# Patient Record
Sex: FEMALE | Race: WHITE | Employment: UNEMPLOYED | URBAN - METROPOLITAN AREA
[De-identification: names, ages, dates, MRNs, and addresses within clinical notes are randomized per-mention and may not be internally consistent; named-entity substitution may affect disease eponyms.]

---

## 2017-05-08 PROCEDURE — 88175 CYTOPATH C/V AUTO FLUID REDO: CPT | Performed by: INTERNAL MEDICINE

## 2017-08-11 PROBLEM — H65.23 BILATERAL CHRONIC SEROUS OTITIS MEDIA: Status: ACTIVE | Noted: 2017-08-11

## 2018-05-17 PROCEDURE — 82670 ASSAY OF TOTAL ESTRADIOL: CPT | Performed by: OBSTETRICS & GYNECOLOGY

## 2018-05-17 PROCEDURE — 36415 COLL VENOUS BLD VENIPUNCTURE: CPT | Performed by: OBSTETRICS & GYNECOLOGY

## 2018-05-17 PROCEDURE — 83001 ASSAY OF GONADOTROPIN (FSH): CPT | Performed by: OBSTETRICS & GYNECOLOGY

## 2018-05-17 PROCEDURE — 88305 TISSUE EXAM BY PATHOLOGIST: CPT | Performed by: OBSTETRICS & GYNECOLOGY

## 2018-12-12 PROBLEM — H65.23 BILATERAL CHRONIC SEROUS OTITIS MEDIA: Status: RESOLVED | Noted: 2017-08-11 | Resolved: 2018-12-12

## 2019-09-24 ENCOUNTER — LAB ENCOUNTER (OUTPATIENT)
Dept: LAB | Age: 54
End: 2019-09-24
Attending: PHYSICAL MEDICINE & REHABILITATION
Payer: COMMERCIAL

## 2019-09-24 DIAGNOSIS — K58.9 IRRITABLE BOWEL SYNDROME: ICD-10-CM

## 2019-09-24 DIAGNOSIS — E55.9 VITAMIN D DEFICIENCY, UNSPECIFIED: Primary | ICD-10-CM

## 2019-09-24 DIAGNOSIS — K90.49 EXUDATIVE ENTEROPATHY: ICD-10-CM

## 2019-09-24 LAB
ALBUMIN SERPL-MCNC: 4 G/DL (ref 3.4–5)
ALBUMIN/GLOB SERPL: 1 {RATIO} (ref 1–2)
ALP LIVER SERPL-CCNC: 88 U/L (ref 41–108)
ALT SERPL-CCNC: 26 U/L (ref 13–56)
ANION GAP SERPL CALC-SCNC: 7 MMOL/L (ref 0–18)
AST SERPL-CCNC: 17 U/L (ref 15–37)
BASOPHILS # BLD AUTO: 0.05 X10(3) UL (ref 0–0.2)
BASOPHILS NFR BLD AUTO: 0.7 %
BILIRUB SERPL-MCNC: 0.6 MG/DL (ref 0.1–2)
BUN BLD-MCNC: 9 MG/DL (ref 7–18)
BUN/CREAT SERPL: 9.8 (ref 10–20)
CALCIUM BLD-MCNC: 9.9 MG/DL (ref 8.5–10.1)
CHLORIDE SERPL-SCNC: 104 MMOL/L (ref 98–112)
CO2 SERPL-SCNC: 26 MMOL/L (ref 21–32)
CREAT BLD-MCNC: 0.92 MG/DL (ref 0.55–1.02)
DEPRECATED RDW RBC AUTO: 47 FL (ref 35.1–46.3)
EOSINOPHIL # BLD AUTO: 0.12 X10(3) UL (ref 0–0.7)
EOSINOPHIL NFR BLD AUTO: 1.8 %
ERYTHROCYTE [DISTWIDTH] IN BLOOD BY AUTOMATED COUNT: 13.3 % (ref 11–15)
GLOBULIN PLAS-MCNC: 4 G/DL (ref 2.8–4.4)
GLUCOSE BLD-MCNC: 102 MG/DL (ref 70–99)
HCT VFR BLD AUTO: 46.2 % (ref 35–48)
HGB BLD-MCNC: 15.2 G/DL (ref 12–16)
IMM GRANULOCYTES # BLD AUTO: 0.02 X10(3) UL (ref 0–1)
IMM GRANULOCYTES NFR BLD: 0.3 %
LYMPHOCYTES # BLD AUTO: 1.96 X10(3) UL (ref 1–4)
LYMPHOCYTES NFR BLD AUTO: 28.8 %
M PROTEIN MFR SERPL ELPH: 8 G/DL (ref 6.4–8.2)
MCH RBC QN AUTO: 31.3 PG (ref 26–34)
MCHC RBC AUTO-ENTMCNC: 32.9 G/DL (ref 31–37)
MCV RBC AUTO: 95.3 FL (ref 80–100)
MONOCYTES # BLD AUTO: 0.47 X10(3) UL (ref 0.1–1)
MONOCYTES NFR BLD AUTO: 6.9 %
NEUTROPHILS # BLD AUTO: 4.19 X10 (3) UL (ref 1.5–7.7)
NEUTROPHILS # BLD AUTO: 4.19 X10(3) UL (ref 1.5–7.7)
NEUTROPHILS NFR BLD AUTO: 61.5 %
OSMOLALITY SERPL CALC.SUM OF ELEC: 283 MOSM/KG (ref 275–295)
PLATELET # BLD AUTO: 250 10(3)UL (ref 150–450)
POTASSIUM SERPL-SCNC: 3.8 MMOL/L (ref 3.5–5.1)
RBC # BLD AUTO: 4.85 X10(6)UL (ref 3.8–5.3)
SODIUM SERPL-SCNC: 137 MMOL/L (ref 136–145)
T3 SERPL-MCNC: 63 NG/DL (ref 60–181)
T3FREE SERPL-MCNC: 2.26 PG/ML (ref 2.4–4.2)
T4 FREE SERPL-MCNC: 1.5 NG/DL (ref 0.8–1.7)
T4 SERPL-MCNC: 13.4 UG/DL (ref 4.8–13.9)
THYROPEROXIDASE AB SERPL-ACNC: 973 U/ML (ref ?–60)
TSI SER-ACNC: 0.98 MIU/ML (ref 0.36–3.74)
VIT B12 SERPL-MCNC: 441 PG/ML (ref 193–986)
VIT D+METAB SERPL-MCNC: 33.9 NG/ML (ref 30–100)
WBC # BLD AUTO: 6.8 X10(3) UL (ref 4–11)

## 2019-09-24 PROCEDURE — 84207 ASSAY OF VITAMIN B-6: CPT

## 2019-09-24 PROCEDURE — 85025 COMPLETE CBC W/AUTO DIFF WBC: CPT

## 2019-09-24 PROCEDURE — 84481 FREE ASSAY (FT-3): CPT

## 2019-09-24 PROCEDURE — 84439 ASSAY OF FREE THYROXINE: CPT

## 2019-09-24 PROCEDURE — 82607 VITAMIN B-12: CPT

## 2019-09-24 PROCEDURE — 84443 ASSAY THYROID STIM HORMONE: CPT

## 2019-09-24 PROCEDURE — 84436 ASSAY OF TOTAL THYROXINE: CPT

## 2019-09-24 PROCEDURE — 86376 MICROSOMAL ANTIBODY EACH: CPT

## 2019-09-24 PROCEDURE — 82306 VITAMIN D 25 HYDROXY: CPT

## 2019-09-24 PROCEDURE — 83735 ASSAY OF MAGNESIUM: CPT

## 2019-09-24 PROCEDURE — 80053 COMPREHEN METABOLIC PANEL: CPT

## 2019-09-24 PROCEDURE — 84480 ASSAY TRIIODOTHYRONINE (T3): CPT

## 2019-09-25 PROCEDURE — 88305 TISSUE EXAM BY PATHOLOGIST: CPT | Performed by: OBSTETRICS & GYNECOLOGY

## 2019-09-27 LAB
MAGNESIUM, RBCS: 2.5 MMOL/L
VITAMIN B6: 36.2 NMOL/L

## 2024-02-15 ENCOUNTER — MED REC SCAN ONLY (OUTPATIENT)
Dept: PHYSICAL MEDICINE AND REHAB | Facility: CLINIC | Age: 59
End: 2024-02-15

## 2024-03-21 ENCOUNTER — MED REC SCAN ONLY (OUTPATIENT)
Dept: PHYSICAL MEDICINE AND REHAB | Facility: CLINIC | Age: 59
End: 2024-03-21

## 2024-03-25 ENCOUNTER — OFFICE VISIT (OUTPATIENT)
Dept: PHYSICAL MEDICINE AND REHAB | Facility: CLINIC | Age: 59
End: 2024-03-25
Payer: COMMERCIAL

## 2024-03-25 ENCOUNTER — TELEPHONE (OUTPATIENT)
Dept: PHYSICAL MEDICINE AND REHAB | Facility: CLINIC | Age: 59
End: 2024-03-25

## 2024-03-25 VITALS — RESPIRATION RATE: 18 BRPM | WEIGHT: 210 LBS | HEIGHT: 70 IN | BODY MASS INDEX: 30.06 KG/M2

## 2024-03-25 DIAGNOSIS — M17.10 PRIMARY OSTEOARTHRITIS OF KNEE, UNSPECIFIED LATERALITY: ICD-10-CM

## 2024-03-25 DIAGNOSIS — R26.9 GAIT ABNORMALITY: ICD-10-CM

## 2024-03-25 DIAGNOSIS — M22.2X9 PATELLOFEMORAL PAIN SYNDROME, UNSPECIFIED LATERALITY: ICD-10-CM

## 2024-03-25 DIAGNOSIS — M16.11 PRIMARY OSTEOARTHRITIS OF RIGHT HIP: Primary | ICD-10-CM

## 2024-03-25 DIAGNOSIS — M79.10 MYALGIA: ICD-10-CM

## 2024-03-25 RX ORDER — LEVOTHYROXINE SODIUM 0.05 MG/1
50 TABLET ORAL
COMMUNITY
Start: 2024-03-23

## 2024-03-25 NOTE — PATIENT INSTRUCTIONS
1) Please bring me images of the knee and hip  2) My office will call you to schedule the RIGHT hip CSI under ultrasound guidance once the procedure is approved by your insurance carrier.    3) Please call and schedule your MRI right hip at [phone].  Once you have your MRI scheduled, then call my office again to schedule a follow-up visit soon after your MRI so we may review the images together.  4) Continue home exercise program   5) Let me know which way you would like to proceed (injection vs MRI). My office will call you to schedule and you can let them know.

## 2024-03-25 NOTE — TELEPHONE ENCOUNTER
Initiated authorization for RIGHT hip CSI under ultrasound guidance CPT/HCPCS [id number],  with [employer]  Status: Approved-authorization is not required per health plan however may be subject to review once claim is submitted

## 2024-03-25 NOTE — H&P
[facility]  Clinic H&P    Requesting Physician: Sherri Hernandez MD    Chief Complaint (Reason for Visit):    Chief Complaint   Patient presents with    New Patient     New R handed patient Rfd by PT for R hip pain. Long hx of R hip pain. PT in past and now recently twice weekly for the past 3 months. Denies much progress. Locates her pain to R hip with radiation into groin down the thigh. Her pain varies dependent on activity and duration. Denies T/N. + weakness. Xray to R hip and R knee 10/2024 @[facility]. Rates her pain 6/10       History of Present Illness:  The patient is a 58 year old right-handed female with significant past medical history of RIGHT hip and knee osteoarthritis who presents with right hip discomfort and gait abnormality with weakness in the right leg.  This is been ongoing for several months now without inciting event.  She rates her discomfort about a 6 out of 10, aching, and nonradiating.  Her symptoms are aggravated with going from sit to stand and walking as well as standing.  She has been seen by orthopedic surgery at [facility] who performed x-rays of the pelvis and the right knee.  She also had a right knee injection performed without improvement.  She has completed physical therapy twice per week since November 2023 at [facility] but continues to feel the discomfort.  She denies any hip injections or MRIs.  She denies paresthesias or focal weakness.  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  PAST MEDICAL HISTORY:   Past Medical History:   Diagnosis Date    ALLERGIC RHINITIS     Hashimoto disease     MITRAL VALVE PROLAPSE     no insufficiency    OBESITY     OTHER DISEASES     elevated ocular pressure    Plantar fasciitis          Raynaud's disease          Uterine fibroid             PAST SURGICAL HISTORY:   Past Surgical History:   Procedure Laterality Date    ARTHROSCOPY OF JOINT UNLISTED  02/2012    Right knee    CHOLECYSTECTOMY       HERNIA SURGERY      umbical hernia mesh          x 3         FAMILY HISTORY:   Family History   Problem Relation Age of Onset    Other (abdominal aortic aneurysm[other]) Mother     Lipids Mother     Hypertension Mother     Heart Disorder Mother         CABG age 60    Cancer Maternal Grandfather         bone    Cancer Paternal Grandfather         bone    Kidney Disease Father        SOCIAL HISTORY:   Social History     Occupational History    Not on file   Tobacco Use    Smoking status: Never    Smokeless tobacco: Never   Vaping Use    Vaping Use: Never used   Substance and Sexual Activity    Alcohol use: Yes     Comment: occ    Drug use: No    Sexual activity: Yes     Partners: Male     Birth control/protection: Vasectomy       CURRENT MEDICATIONS:   Current Outpatient Medications   Medication Sig Dispense Refill    levothyroxine 50 MCG Oral Tab Take 1 tablet (50 mcg total) by mouth before breakfast.      latanoprost 0.005 % Ophthalmic Solution Place 1 drop into both eyes nightly. 7.5 mL 3    NP THYROID 120 MG Oral Tab Take 1 tablet (120 mg total) by mouth daily.          ALLERGIES:   Allergies   Allergen Reactions    Gluten Meal OTHER (SEE COMMENTS)     GLUTEN FREE    Amoxicillin-Na Benzoate     Cleocin HIVES    Food Allergy Formula     Gatifloxacin Sesquihydrate HIVES    Ib Pro [Ibuprofen]     Nizoral [Ketoconazole]     Aspirin          REVIEW OF SYSTEMS:   Review of Systems   Constitutional: Negative.    HENT: Negative.    Eyes: Negative.    Respiratory: Negative.    Cardiovascular: Negative.    Gastrointestinal: Negative.    Genitourinary: Negative.    Musculoskeletal: As per HPI   Skin: Negative.    Neurological: As per HPI  Endo/Heme/Allergies: Negative.    Psychiatric/Behavioral: Negative.      All other systems reviewed and are negative. Pertinent positives and negatives noted in the HPI.        PHYSICAL EXAM:   Resp 18   Ht 70\"   Wt 210 lb (95.3 kg)   LMP  (LMP Unknown)   BMI 30.13 kg/m²      Body mass index is 30.13 kg/m².      General: No immediate distress  Head: Normocephalic/ Atraumatic  Eyes: Extra-occular movements intact.   Ears: No auricular hematoma or deformities  Mouth: No lesions or ulcerations  Heart: peripheral pulses intact. Normal capillary refill.   Lungs: Non-labored respirations  Abdomen: No abdominal guarding  Extremities: No lower extremity edema bilaterally   Skin: No lesions noted.   Cognition: alert & oriented x 3, attentive, able to follow 2 step commands, comprehention intact, spontaneous speech intact  Motor:    Musculoskeletal:    HIP:  Inspection: no erythema, swelling, or obvious deformity  Palpation: no ttp over greater trochanter or down the ITB, ASIS, psoas muscle, anterior thigh (over quad), ischial tuberosity, or piriformis  ROM:  limited in hip flexion and internal rotation.  When going into hip flexion, her right hip will externally rotate  Strength: 5/5 in all myotomes of the BILATERAL lower extremities   Sensation: Intact to light touch in all dermatomes of the lower extremities   Reflexes: 2/4 at L4 and S1  Hip Grind Test: Negative  TYRESE: Positive for groin discomfort  FADIR: Positive for groin discomfort      Gait:  Antalgic    Data  No visits with results within 6 Month(s) from this visit.   Latest known visit with results is:   Office Visit on 05/19/2021   Component Date Value Ref Range Status    Case Report 05/19/2021    Final                    Value:Surgical Pathology Report                         Case: [id number]                                    Authorizing Provider:  Sridevi Hendricks MD       Collected:           05/19/2021 03:26 PM          Ordering Location:     OB-GYN - Vik[facility] Kennedy,      Received:            05/19/2021 03:26 PM                                 [place]                                                                   Pathologist:           Angelica Kellogg MD                                                        Specimen:     Tissue, endometrial tissue                                                                 Final Diagnosis 05/19/2021    Final                    Value:This result contains rich text formatting which cannot be displayed here.    Gross Description 05/19/2021    Final                    Value:This result contains rich text formatting which cannot be displayed here.   ]      Radiology Imaging:  I reviewed with the patient her X-ray of the lumbar spine, hip right, and knees right   X-ray right knee  DATE OF SERVICE: 10.11.2023   Bilateral AP weightbearing, lateral and skyline view of the right knee   shows   severe degenerative joint disease most pronounced in the lateral joint   space of   the right knee, creating a valgus alignment abnormality. Mild degenerative     changes in the lateral joint space of the left knee are appreciated.   Patellofemoral joint has some mild degenerative changes, right greater   than   left. Soft tissue shows no significant abnormalities outside of some   calcification of the popliteal space that could represent a loose body. No   acute   abnormalities appreciated.       Scout Steele MD     X-ray lumbar spine  DATE OF SERVICE: 10.11.2023   AP and lateral of the lumbar spine shows no acute abnormalities. No   spondylolisthesis or spondylolysis. Mild degenerative disk disease is   noted at   multiple levels with superior and inferior endplate spurring occurring.   This is   most pronounced at T12-L1.       Scout Steele MD     X-ray pelvis  DATE OF SERVICE: 05.24.2023  EXAM: XR HIP W OR WO PELVIS 2 OR 3 VIEWS, RIGHT (CPT=73502), 5/24/2023 2:45 PM    COMPARISON: X-ray of the right hip of 1/12/2021    HISTORY:  Right groin and buttock pain no known injury. Decreased range of motion.    FINDINGS: 2 views of the right hip. There is redemonstration of at least moderate degenerative  changes of the right hip with joint space narrowing, subchondral sclerotic and cystic changes,  and  marginal osteophytes. There is no evidence of acute fracture or dislocation. There are degenerative  changes at the right sacroiliac joint. Pelvic phleboliths noted.    =====  IMPRESSION: At least moderate degenerative changes of the right hip which have mildly progressed  when compared to the exam in 2021. There are otherwise no acute radiographic findings.  Exam End: 05/24/23  2:48 PM       ASSESSMENT AND PLAN:  Pamela is a pleasant 58-year-old female presents with right hip discomfort and gait abnormality stemming from what I believed to be right hip osteoarthritis.  I am recommending an MRI of the right hip as well as a diagnostic and therapeutic corticosteroid injection under ultrasound guidance.  She will call me to schedule this.  She does not have to continue with formal therapy but should continue with her home exercise program.       RTC in 4 weeks or sooner for MRI review and right hip injection    Discharge Instructions were provided as documented in AVS summary.  The patient was in agreement with the assessment and plan.  All questions were answered.  There were no barriers to learning.         1. Primary osteoarthritis of right hip    2. Myalgia    3. Gait abnormality    4. Patellofemoral pain syndrome, unspecified laterality    5. Primary osteoarthritis of knee, unspecified laterality        Alex B. Behar MD, [facility]  Physical Medicine and Rehabilitation/Sports Medicine  [facility]

## 2024-04-02 ENCOUNTER — TELEPHONE (OUTPATIENT)
Dept: PHYSICAL MEDICINE AND REHAB | Facility: CLINIC | Age: 59
End: 2024-04-02

## 2024-04-02 NOTE — TELEPHONE ENCOUNTER
Pt dropped off XR knee procedure report from [facility] along with a CD.  Please download and review and return in the mail. Placed in RN's folder  Thanks

## 2024-04-03 NOTE — TELEPHONE ENCOUNTER
CD containing:   - XR KNEE (completed 10/11/2023)  - XR LUMBAR SPINE (completed 10/11/2023)  - XR HIP W OR WO PELVIS (completed 05/24/2023)    All images uploaded to PACS. Confirmed images to be viewable/reviewed. Patient barcode printed & attached to written report, can be placed for scanning once MD has completed review.    [facility] with given address on file/patient drop off form.     Pt's chart forwarded to Dr. Behar for his review/recommendations.   Will await MD's response if applicable.

## 2024-05-02 ENCOUNTER — HOSPITAL ENCOUNTER (OUTPATIENT)
Dept: MRI IMAGING | Age: 59
Discharge: HOME | End: 2024-05-02
Attending: PHYSICAL MEDICINE & REHABILITATION
Payer: COMMERCIAL

## 2024-05-02 DIAGNOSIS — M79.10 MYALGIA: ICD-10-CM

## 2024-05-02 DIAGNOSIS — R26.9 GAIT ABNORMALITY: ICD-10-CM

## 2024-05-02 DIAGNOSIS — M17.10 PRIMARY OSTEOARTHRITIS OF KNEE, UNSPECIFIED LATERALITY: ICD-10-CM

## 2024-05-02 DIAGNOSIS — M16.11 PRIMARY OSTEOARTHRITIS OF RIGHT HIP: ICD-10-CM

## 2024-05-02 DIAGNOSIS — M22.2X9 PATELLOFEMORAL PAIN SYNDROME, UNSPECIFIED LATERALITY: ICD-10-CM

## 2024-05-02 PROCEDURE — 73721 MRI JNT OF LWR EXTRE W/O DYE: CPT | Performed by: PHYSICAL MEDICINE & REHABILITATION

## 2024-05-07 ENCOUNTER — TELEMEDICINE (OUTPATIENT)
Dept: PHYSICAL MEDICINE AND REHAB | Facility: CLINIC | Age: 59
End: 2024-05-07
Payer: COMMERCIAL

## 2024-05-07 DIAGNOSIS — R26.9 GAIT ABNORMALITY: ICD-10-CM

## 2024-05-07 DIAGNOSIS — M79.10 MYALGIA: ICD-10-CM

## 2024-05-07 DIAGNOSIS — M22.2X9 PATELLOFEMORAL PAIN SYNDROME, UNSPECIFIED LATERALITY: ICD-10-CM

## 2024-05-07 DIAGNOSIS — M17.10 PRIMARY OSTEOARTHRITIS OF KNEE, UNSPECIFIED LATERALITY: ICD-10-CM

## 2024-05-07 DIAGNOSIS — M16.11 PRIMARY OSTEOARTHRITIS OF RIGHT HIP: Primary | ICD-10-CM

## 2024-05-07 PROCEDURE — 99214 OFFICE O/P EST MOD 30 MIN: CPT | Performed by: PHYSICAL MEDICINE & REHABILITATION

## 2024-05-07 NOTE — PROGRESS NOTES
[facility]  Video Visit Progress Note      Telehealth outside of [facility]  Telehealth Verbal Consent   I conducted a telehealth visit with Pamela Pearson today, 05/07/24, which was completed using two-way, real-time interactive audio and video communication. This has been done in good alice to provide continuity of care in the best interest of the provider-patient relationship, due to the COVID -19 public health crisis/national emergency where restrictions of face-to-face office visits are ongoing. Every conscious effort was taken to allow for sufficient and adequate time to complete the visit.  The patient was made aware of the limitations of the telehealth visit, including treatment limitations as no physical exam could be performed.  The patient was advised to call 911 or to go to the ER in case there was an emergency.  The patient was also advised of the potential privacy & security concerns related to the telehealth platform.   The patient was made aware of where to find [facility]'s notice of privacy practices, telehealth consent form and other related consent forms and documents.  which are located on the [facility] website. The patient verbally agreed to telehealth consent form, related consents and the risks discussed.    Lastly, the patient confirmed that they were in [place].   Included in this visit, time may have been spent reviewing labs, medications, radiology tests and decision making. Appropriate medical decision-making and tests are ordered as detailed in the plan of care above.  Coding/billing information is submitted for this visit based on complexity of care and/or time spent for the visit.    CHIEF COMPLAINT:    Chief Complaint   Patient presents with    Hip Pain    Imaging       History of Present Illness:  The patient is a 58 year old  right-handed female with significant past medical history of RIGHT hip and knee osteoarthritis who presents with right hip  discomfort and gait abnormality with weakness in the right leg.  She continues to have discomfort in the right hip and groin region.  She did have an MRI of the right hip which I independently reviewed with her today.  She is also brought in her x-rays of the pelvis as well as the knees which I reviewed and demonstrate osteoarthritic changes.    PAST MEDICAL HISTORY:  Past Medical History:    ALLERGIC RHINITIS    Hashimoto disease    MITRAL VALVE PROLAPSE    no insufficiency    OBESITY    OTHER DISEASES    elevated ocular pressure    Plantar fasciitis         Raynaud's disease         Uterine fibroid            SURGICAL HISTORY:  Past Surgical History:   Procedure Laterality Date    Arthroscopy of joint unlisted  2012    Right knee    Cholecystectomy      Hernia surgery      umbical hernia mesh          x 3        SOCIAL HISTORY:   Social History     Occupational History    Not on file   Tobacco Use    Smoking status: Never    Smokeless tobacco: Never   Vaping Use    Vaping status: Never Used   Substance and Sexual Activity    Alcohol use: Yes     Comment: occ    Drug use: No    Sexual activity: Yes     Partners: Male     Birth control/protection: Vasectomy       FAMILY HISTORY:   Family History   Problem Relation Age of Onset    Other (abdominal aortic aneurysm[other]) Mother     Lipids Mother     Hypertension Mother     Heart Disorder Mother         CABG age 60    Cancer Maternal Grandfather         bone    Cancer Paternal Grandfather         bone    Kidney Disease Father        CURRENT MEDICATIONS:   Current Outpatient Medications   Medication Sig Dispense Refill    levothyroxine 50 MCG Oral Tab Take 1 tablet (50 mcg total) by mouth before breakfast.      latanoprost 0.005 % Ophthalmic Solution Place 1 drop into both eyes nightly. 7.5 mL 3    NP THYROID 120 MG Oral Tab Take 1 tablet (120 mg total) by mouth daily.         ALLERGIES:   Allergies   Allergen Reactions    Gluten Meal OTHER (SEE COMMENTS)      GLUTEN FREE    Amoxicillin-Na Benzoate     Cleocin HIVES    Food Allergy Formula     Gatifloxacin Sesquihydrate HIVES    Ib Pro [Ibuprofen]     Nizoral [Ketoconazole]     Aspirin        REVIEW OF SYSTEMS:   Review of Systems   Constitutional: Negative.    HENT: Negative.    Eyes: Negative.    Respiratory: Negative.    Cardiovascular: Negative.    Gastrointestinal: Negative.    Genitourinary: Negative.    Musculoskeletal: As per HPI  Skin: Negative.    Neurological: As per HPI  Endo/Heme/Allergies: Negative.    Psychiatric/Behavioral: Negative.      All other systems reviewed and are negative. Pertinent positives and negatives noted in the HPI.        PHYSICAL EXAM:     There is no height or weight on file to calculate BMI.    General: No immediate distress  Head: Normocephalic/ Atraumatic  Eyes: Extra-occular movements intact  Ears/Nose/Throat:  External appearance identifies normal appearance without obvious deformity  Cardiovascular: No cyanosis, clubbing or edema  Respiratory: Non-labored respirations  Skin: No lesions noted   Neurological: alert & oriented x 3, attentive, able to follow commands, comprehention intact, spontaneous speech intact  Psychiatric: Mood and affect appropriate        Data  No visits with results within 6 Month(s) from this visit.   Latest known visit with results is:   Office Visit on 05/19/2021   Component Date Value Ref Range Status    Case Report 05/19/2021    Final                    Value:Surgical Pathology Report                         Case: [id number]                                    Authorizing Provider:  Sridevi Hendricks MD       Collected:           05/19/2021 03:26 PM          Ordering Location:     OB-GYN - Vik[facility] Kennedy,      Received:            05/19/2021 03:26 PM                                 [place]                                                                   Pathologist:           Angelica Kellogg MD                                                         Specimen:    Tissue, endometrial tissue                                                                 Final Diagnosis 05/19/2021    Final                    Value:This result contains rich text formatting which cannot be displayed here.    Gross Description 05/19/2021    Final                    Value:This result contains rich text formatting which cannot be displayed here.   ]      Radiology Imaging:  I reviewed with the patient her MRI of the hip right from 5/2/2024  MRI HIPS, RIGHT (CPT=73721)  Narrative: PROCEDURE: MRI HIPS, RIGHT (CPT=73721)     COMPARISON: External Exams, XR HIP, UNILATERAL, COMPLETE (MIN2 VIEWS), RIGHT (CPT=73510), 5/24/2023, 2:30 PM.     INDICATIONS: M17.10 Primary osteoarthritis of knee, unspecified laterality M22.2X9 Patellofemoral pain syndrome, unspecified laterality R26.9 Gait abnormality M79.10 Myalgi*     TECHNIQUE: A comprehensive examination was performed utilizing a variety of imaging planes and imaging parameters to optimize visualization of suspected pathology.  Images were performed without contrast.     FINDINGS:   FEMORAL HEAD: Full-thickness chondral loss with lateral femoral head neck osteophytes. Tiny subchondral cysts seen within the superior lateral femoral head. No acute fracture, dislocation or marrow replacing lesion.  ACETABULUM: Full-thickness chondral loss within the acetabulum with lateral acetabular osteophytes. Tiny subchondral cysts seen within the superior acetabulum. No acute fracture, dislocation or marrow replacing lesion.  OTHER BONES: Degenerative changes are seen within the sacroiliac joints. Degenerative disc and facet disease is partially visualized in the lower lumbar spine. Degenerative changes are seen at the pubic symphysis.  LABRUM: There is a linear cleft of high signal under cutting the base of the anterior and superior labrum at their acetabular attachments consistent with nondisplaced degenerative tears.  EFFUSIONS: Small right hip joint  effusion with synovitis  BURSAE: There is thickening with increased fluid in the trochanteric bursa.  TENDONS: Thickening with increased signal of the gluteus minimus and medius at the greater trochanter. No full-thickness or retracted tear.  MUSCLES: No tear or strain.  No inappropriate atrophy.    OTHER: 3.8 cm left uterine fibroid.              Impression: CONCLUSION:      Moderate right hip osteoarthritis.     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     Small right hip joint effusion with synovitis.    .           Dictated by (CST): River Edouard MD on 5/02/2024 at 2:59 PM       Finalized by (CST): River Edouard MD on 5/02/2024 at 3:11 PM              ASSESSMENT AND PLAN:  Pamela is a pleasant 58-year-old female presents for follow-up of her right hip pain due to osteoarthritis and gluteal tendinopathy.  I have reviewed her most recent MRI with her.  I am recommending the right hip corticosteroid injection which she is scheduled for next week.  She will follow-up with me about 1 to 2 months after the injection and we can consider next steps.       RTC in 1 to 2 months after injection  Discharge Instructions were provided as documented in AVS summary.  The patient was in agreement with the assessment and plan.  All questions were answered.  There were no barriers to learning.    We discussed that a telemedicine visit is in place of an office visit; however, this limits the ability to perform a thorough physical examination which may affect objective findings related to a specific condition and can affect treatment.  We also discussed that NSAIDs may mask or worsen COVID-19 infection symptoms.  The patient was also informed that corticosteroids, in any form, may significantly decrease immune response and may increase risk and complications of infection.    The patient was advised that given the current situation with COVID-19, it is in his/her best interest to socially distance  his/herself. Given this, we are not recommending any elective procedures or office visits at the outpatient surgery center or in the office respectively unless deemed necessary.  My staff will be reaching out to the patient for the elective procedure when it is considered appropriate and the patient can follow-up in office as well when appropriate.  In the meantime, I will be available for telephone and video encounter.          1. Primary osteoarthritis of right hip    2. Myalgia    3. Gait abnormality    4. Patellofemoral pain syndrome, unspecified laterality    5. Primary osteoarthritis of knee, unspecified laterality        Alex B. Behar MD  Physical Medicine and Rehabilitation/Sports Medicine  [facility]

## 2024-05-14 ENCOUNTER — OFFICE VISIT (OUTPATIENT)
Dept: PHYSICAL MEDICINE AND REHAB | Facility: CLINIC | Age: 59
End: 2024-05-14

## 2024-05-14 DIAGNOSIS — R26.9 GAIT ABNORMALITY: ICD-10-CM

## 2024-05-14 DIAGNOSIS — M16.11 PRIMARY OSTEOARTHRITIS OF RIGHT HIP: Primary | ICD-10-CM

## 2024-05-14 DIAGNOSIS — M79.10 MYALGIA: ICD-10-CM

## 2024-05-14 DIAGNOSIS — M22.2X9 PATELLOFEMORAL PAIN SYNDROME, UNSPECIFIED LATERALITY: ICD-10-CM

## 2024-05-14 DIAGNOSIS — M17.10 PRIMARY OSTEOARTHRITIS OF KNEE, UNSPECIFIED LATERALITY: ICD-10-CM

## 2024-05-14 PROCEDURE — 20611 DRAIN/INJ JOINT/BURSA W/US: CPT | Performed by: PHYSICAL MEDICINE & REHABILITATION

## 2024-05-14 RX ORDER — TRIAMCINOLONE ACETONIDE 40 MG/ML
40 INJECTION, SUSPENSION INTRA-ARTICULAR; INTRAMUSCULAR ONCE
Status: DISCONTINUED | OUTPATIENT
Start: 2024-05-14 | End: 2024-05-14

## 2024-05-14 RX ORDER — LIDOCAINE HYDROCHLORIDE 10 MG/ML
12 INJECTION, SOLUTION INFILTRATION; PERINEURAL ONCE
Status: COMPLETED | OUTPATIENT
Start: 2024-05-14 | End: 2024-05-14

## 2024-05-14 RX ORDER — TRIAMCINOLONE ACETONIDE 40 MG/ML
40 INJECTION, SUSPENSION INTRA-ARTICULAR; INTRAMUSCULAR ONCE
Status: COMPLETED | OUTPATIENT
Start: 2024-05-14 | End: 2024-05-14

## 2024-05-14 RX ORDER — LIDOCAINE HYDROCHLORIDE 10 MG/ML
12 INJECTION, SOLUTION INFILTRATION; PERINEURAL ONCE
Status: DISCONTINUED | OUTPATIENT
Start: 2024-05-14 | End: 2024-05-14

## 2024-05-14 NOTE — PATIENT INSTRUCTIONS
Post Injection Instructions     Please do not do anything strenuous over the next two days (if you had a knee injection do not walk more than 2 city blocks, do not attend any aerobic classes, do not run, no heavy lifting, no prolong standing).  You may resume your day to day activities after your injection.  You may experience some mild amount of swelling after the procedure.  Please ice your joint that was injected at least 5-6 times a day (15 minutes) for two days after (this will help prevent worsening pain that sometimes occurs after an injection).  Only take tylenol if needed for pain for the first few days.  Watch for signs of infection which include redness, warmth, worsening pain, fevers or chills.  If you develop any of these signs call the office immediately at [phone]    Everyone responds differently to injections, but you can expect your peak effects a few weeks after your last injection.  Alex B. Behar MD  Physical Medicine and Rehabilitation/Sports Medicine  [facility]

## 2024-05-14 NOTE — PROCEDURES
[facility]   Hip Injection Procedure Note    CHIEF COMPLAINT:    Chief Complaint   Patient presents with    Follow - Up     LOV 3/25/24. Patient presents for right hip CSI.        PROCEDURE PERFORMED:  Right hip  corticosteroid injection under ultrasound guidance    INDICATIONS:  Right hip pain and osteoarthritis    PRIMARY PROCEDURALIST:  Alex Behar, MD    INFORMED CONSENT & TIME OUT:   As documented in the Time Out and Pre-Procedure Check Lists.  Verbal consent was obtained    Vitals: [unfilled]  Labs (document last wbc, plts, hgb, and PT/INR):   No visits with results within 6 Month(s) from this visit.   Latest known visit with results is:   Office Visit on 05/19/2021   Component Date Value Ref Range Status    Case Report 05/19/2021    Final                    Value:Surgical Pathology Report                         Case: [id number]                                    Authorizing Provider:  Sridevi Hendricks MD       Collected:           05/19/2021 03:26 PM          Ordering Location:     OB-GYN - [address],      Received:            05/19/2021 03:26 PM                                 [place]                                                                   Pathologist:           Angelica Kellogg MD                                                        Specimen:    Tissue, endometrial tissue                                                                 Final Diagnosis 05/19/2021    Final                    Value:This result contains rich text formatting which cannot be displayed here.    Gross Description 05/19/2021    Final                    Value:This result contains rich text formatting which cannot be displayed here.   ]    PROCEDURE:    The risks and benefits of intraarticular corticosteroid injection were again explained to the patient and verbal consent was obtained. The Right hip was prepped and draped in the usual sterile fashion. Using a curvilinear  ultrasound transducer, the Right  femoro-acetabular joint was identified and prior to inserting the needle the patient's major vessels including the femoral artery and vein were visualized and found to be medial to the chosen approach. Approximately 5 cc of 1% lidocaine were used to anesthetize the skin and deep soft tissue under ultrasound guidance with a curvilinear probe and then a 22-gauge 3.5-inch needle was introduced into the anterolateral aspect of the femoral acetabular space.  Aspiration was attempted and there was no fluid able to be aspirated.  The distal aspect of the needle was visualized abutting the femoral neck and slightly retracted and4 mL of 1% lidocaine and 1 mL of 40 mg/mL Kenalog was injected into the intra-articular space. The hip capsule was seen to arise throughout the injection.  The needle was then removed and hemostasis was obtained.  The skin site was cleansed and a clean dressing was applied. The patient tolerated the procedure well.     Patient verbalized understanding of assessment and plan.  Patient is in agreement with the plan.  All questions were answered.  No barriers to learning identified. Permanent pictures were saved.       INSTRUCTIONS GIVEN TO PATIENT:    \"You will see an effect in the next 2-3 days.  Please contact me if you have fevers, worsening swelling, worsening pain, decreased range of motion, increased redness, chills, or anything that makes you concerned about how the joint we injected feels/looks.  If you do not reach me in a reasonable time, please report directly to the emergency room for further evaluation\"    6 weeks     Alex B. Behar MD, [facility]  Physical Medicine and Rehabilitation/Sports Medicine  [facility]

## 2024-07-02 ENCOUNTER — TELEPHONE (OUTPATIENT)
Dept: PHYSICAL MEDICINE AND REHAB | Facility: CLINIC | Age: 59
End: 2024-07-02

## 2024-07-02 ENCOUNTER — OFFICE VISIT (OUTPATIENT)
Dept: PHYSICAL MEDICINE AND REHAB | Facility: CLINIC | Age: 59
End: 2024-07-02
Payer: COMMERCIAL

## 2024-07-02 VITALS — BODY MASS INDEX: 30.35 KG/M2 | WEIGHT: 212 LBS | HEIGHT: 70 IN

## 2024-07-02 DIAGNOSIS — M79.10 MYALGIA: ICD-10-CM

## 2024-07-02 DIAGNOSIS — M17.10 PRIMARY OSTEOARTHRITIS OF KNEE, UNSPECIFIED LATERALITY: ICD-10-CM

## 2024-07-02 DIAGNOSIS — M22.2X9 PATELLOFEMORAL PAIN SYNDROME, UNSPECIFIED LATERALITY: ICD-10-CM

## 2024-07-02 DIAGNOSIS — R26.9 GAIT ABNORMALITY: ICD-10-CM

## 2024-07-02 DIAGNOSIS — M16.11 PRIMARY OSTEOARTHRITIS OF RIGHT HIP: Primary | ICD-10-CM

## 2024-07-02 PROCEDURE — 3008F BODY MASS INDEX DOCD: CPT | Performed by: PHYSICAL MEDICINE & REHABILITATION

## 2024-07-02 PROCEDURE — 99214 OFFICE O/P EST MOD 30 MIN: CPT | Performed by: PHYSICAL MEDICINE & REHABILITATION

## 2024-07-02 NOTE — TELEPHONE ENCOUNTER
Initiated authorization for RIGHT hip CSI under Ultrasound Guidance CPT/[facility] [id number],  with Availity  Status: Approved-authorization is not required per health plan based on medical necessity however is not a guarantee of payment and may be subject to review once claim is submitted valid 7/2/24-10/2/24        AND    Initiated authorization for BILATERAL knee Durolane and CSI under ultrasound guidance CPT/[facility] [id number],  x2,  x2 with Availity  Status: Approved-authorization is not required per health plan based on medical necessity however is not a guarantee of payment and may be subject to review once claim is submitted valid 7/2/24-10/2/24

## 2024-07-02 NOTE — PROGRESS NOTES
[facility]  Progress Note    CHIEF COMPLAINT:    Chief Complaint   Patient presents with    Follow - Up     INJ: 2024 pt comes in to f/u on Right hip  corticosteroid injection. Admits 80% improvement. Presents with L knee stabbing pain. Also c/o limited ROM in R hip. Denies T/N. Admits some weakness. Rates R hip 2/10, L knee 7/10. Takes tylenol PRN.        History of Present Illness:  The patient is a 58 year old right-handed female with significant past medical history of RIGHT hip and knee osteoarthritis who presents with right hip discomfort and gait abnormality with weakness in the right leg.  She is status post right hip corticosteroid injection on 2024 with about 80% improvement in her symptoms thus far.  She rates the right hip pain a 2 out of 10 but continues to have difficulty with internal rotation of the right hip.  She is also complaining of left knee pain which she rates a 7 out of 10 and is worse first thing in the morning.  Her symptoms improve after a bowel movement.  She is taking Tylenol as needed for pain.  Her knee pain is aggravated by standing and walking.    PAST MEDICAL HISTORY:  Past Medical History:    ALLERGIC RHINITIS    Hashimoto disease    MITRAL VALVE PROLAPSE    no insufficiency    OBESITY    OTHER DISEASES    elevated ocular pressure    Plantar fasciitis         Raynaud's disease         Uterine fibroid            SURGICAL HISTORY:  Past Surgical History:   Procedure Laterality Date    Arthroscopy of joint unlisted  2012    Right knee    Cholecystectomy      Hernia surgery      umbical hernia mesh          x 3        SOCIAL HISTORY:   Social History     Occupational History    Not on file   Tobacco Use    Smoking status: Never    Smokeless tobacco: Never   Vaping Use    Vaping status: Never Used   Substance and Sexual Activity    Alcohol use: Yes     Comment: occ    Drug use: No    Sexual activity: Yes      Partners: Male     Birth control/protection: Vasectomy       FAMILY HISTORY:   Family History   Problem Relation Age of Onset    Other (abdominal aortic aneurysm[other]) Mother     Lipids Mother     Hypertension Mother     Heart Disorder Mother         CABG age 60    Cancer Maternal Grandfather         bone    Cancer Paternal Grandfather         bone    Kidney Disease Father        CURRENT MEDICATIONS:   Current Outpatient Medications   Medication Sig Dispense Refill    levothyroxine 50 MCG Oral Tab Take 1 tablet (50 mcg total) by mouth before breakfast.      latanoprost 0.005 % Ophthalmic Solution Place 1 drop into both eyes nightly. 7.5 mL 3    NP THYROID 120 MG Oral Tab Take 1 tablet (120 mg total) by mouth daily.         ALLERGIES:   Allergies   Allergen Reactions    Gluten Meal OTHER (SEE COMMENTS)     GLUTEN FREE    Amoxicillin-Na Benzoate     Cleocin HIVES    Food Allergy Formula     Gatifloxacin Sesquihydrate HIVES    Ib Pro [Ibuprofen]     Nizoral [Ketoconazole]     Aspirin        REVIEW OF SYSTEMS:   Review of Systems   Constitutional: Negative.    HENT: Negative.    Eyes: Negative.    Respiratory: Negative.    Cardiovascular: Negative.    Gastrointestinal: Negative.    Genitourinary: Negative.    Musculoskeletal: As per HPI  Skin: Negative.    Neurological: As per HPI  Endo/Heme/Allergies: Negative.    Psychiatric/Behavioral: Negative.      All other systems reviewed and are negative. Pertinent positives and negatives noted in the HPI.        PHYSICAL EXAM:   Ht 70\"   Wt 212 lb (96.2 kg)   LMP  (LMP Unknown)   BMI 30.42 kg/m²     Body mass index is 30.42 kg/m².      General: No immediate distress  Head: Normocephalic/ Atraumatic  Eyes: Extra-occular movements intact.   Ears: No auricular hematoma or deformities  Mouth: No lesions or ulcerations  Heart: peripheral pulses intact. Normal capillary refill.   Lungs: Non-labored respirations  Abdomen: No abdominal guarding  Extremities: No lower extremity  edema bilaterally   Skin: No lesions noted.   Cognition: alert & oriented x 3, attentive, able to follow 2 step commands, comprehention intact, spontaneous speech intact  Motor:    Musculoskeletal:        Data  No visits with results within 6 Month(s) from this visit.   Latest known visit with results is:   Office Visit on 05/19/2021   Component Date Value Ref Range Status    Case Report 05/19/2021    Final                    Value:Surgical Pathology Report                         Case: [id number]                                    Authorizing Provider:  Sridevi Hendricks MD       Collected:           05/19/2021 03:26 PM          Ordering Location:     OB-GYN - [place] ,      Received:            05/19/2021 03:26 PM                                 [place]                                                                   Pathologist:           Angelica Kellogg MD                                                        Specimen:    Tissue, endometrial tissue                                                                 Final Diagnosis 05/19/2021    Final                    Value:This result contains rich text formatting which cannot be displayed here.    Gross Description 05/19/2021    Final                    Value:This result contains rich text formatting which cannot be displayed here.   ]      Radiology Imaging:  I reviewed with the patient her MRI of the hip right from 5/2/2024  MRI HIPS, RIGHT (CPT=73721)  Narrative: PROCEDURE: MRI HIPS, RIGHT (CPT=73721)     COMPARISON: External Exams, XR HIP, UNILATERAL, COMPLETE (MIN2 VIEWS), RIGHT (CPT=73510), 5/24/2023, 2:30 PM.     INDICATIONS: M17.10 Primary osteoarthritis of knee, unspecified laterality M22.2X9 Patellofemoral pain syndrome, unspecified laterality R26.9 Gait abnormality M79.10 Myalgi*     TECHNIQUE: A comprehensive examination was performed utilizing a variety of imaging planes and imaging parameters to optimize visualization of suspected  pathology.  Images were performed without contrast.     FINDINGS:   FEMORAL HEAD: Full-thickness chondral loss with lateral femoral head neck osteophytes. Tiny subchondral cysts seen within the superior lateral femoral head. No acute fracture, dislocation or marrow replacing lesion.  ACETABULUM: Full-thickness chondral loss within the acetabulum with lateral acetabular osteophytes. Tiny subchondral cysts seen within the superior acetabulum. No acute fracture, dislocation or marrow replacing lesion.  OTHER BONES: Degenerative changes are seen within the sacroiliac joints. Degenerative disc and facet disease is partially visualized in the lower lumbar spine. Degenerative changes are seen at the pubic symphysis.  LABRUM: There is a linear cleft of high signal under cutting the base of the anterior and superior labrum at their acetabular attachments consistent with nondisplaced degenerative tears.  EFFUSIONS: Small right hip joint effusion with synovitis  BURSAE: There is thickening with increased fluid in the trochanteric bursa.  TENDONS: Thickening with increased signal of the gluteus minimus and medius at the greater trochanter. No full-thickness or retracted tear.  MUSCLES: No tear or strain.  No inappropriate atrophy.    OTHER: 3.8 cm left uterine fibroid.              Impression: CONCLUSION:      Moderate right hip osteoarthritis.     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     Small right hip joint effusion with synovitis.    .           Dictated by (CST): River Edouard MD on 5/02/2024 at 2:59 PM       Finalized by (CST): River Edouard MD on 5/02/2024 at 3:11 PM              ASSESSMENT AND PLAN:  Pamela is a pleasant 58-year-old female who presents for follow-up of her right hip pain due to osteoarthritis.  She is status post right hip injection and is still about 80% improved.  I am recommending she continue home exercise program and we can repeat the right hip injection after  August 14, 2024.  I have placed an order for this.  As a pertains to her knee pain, I believe this is due to osteoarthritis and patellofemoral syndrome.  I am recommending a home exercise program as well as bilateral knee hyaluronic acid and corticosteroid injections under ultrasound guidance.  We will call her to schedule this.  She can use Tylenol as needed for pain.  I have also referred her to the integrative health team.       RTC in 1 month for right hip injection or sooner for bilateral knee injections  Discharge Instructions were provided as documented in AVS summary.  The patient was in agreement with the assessment and plan.  All questions were answered.  There were no barriers to learning.         1. Primary osteoarthritis of right hip    2. Myalgia    3. Gait abnormality    4. Patellofemoral pain syndrome, unspecified laterality    5. Primary osteoarthritis of knee, unspecified laterality        Alex B. Behar MD  Physical Medicine and Rehabilitation/Sports Medicine  [facility]

## 2024-07-02 NOTE — PATIENT INSTRUCTIONS
1) My office will call you to schedule the BILATERAL knee HA and CSI under ultrasound once the procedure is approved by your insurance carrier.    2) My office will call you to schedule the RIGHT hip CSI under US once the procedure is approved by your insurance carrier.  This can be performed after 8/14/24  3)  Ice 20 minutes at a time 3-4 times per day  4) Continue home exercise program   5) Tylenol 500-1000 mg every 6-8 hours as needed for pain.  No more than 3000 mg daily.  6) Please bring in images of the knees and hips if you have them  7) I have placed a consultation for you to see integrative health and discuss gut management

## 2024-08-13 PROBLEM — N95.1 MENOPAUSAL FLUSHING: Status: ACTIVE | Noted: 2024-01-16

## 2024-08-13 PROBLEM — M25.559 HIP PAIN: Status: ACTIVE | Noted: 2024-01-17

## 2024-08-14 ENCOUNTER — LABORATORY ENCOUNTER (OUTPATIENT)
Dept: LAB | Age: 59
End: 2024-08-14
Attending: OBSTETRICS & GYNECOLOGY
Payer: COMMERCIAL

## 2024-08-14 DIAGNOSIS — Z01.818 PRE-OP TESTING: ICD-10-CM

## 2024-08-14 LAB
DEPRECATED RDW RBC AUTO: 42.4 FL (ref 35.1–46.3)
ERYTHROCYTE [DISTWIDTH] IN BLOOD BY AUTOMATED COUNT: 12.6 % (ref 11–15)
HCT VFR BLD AUTO: 41.3 %
HGB BLD-MCNC: 14.2 G/DL
MCH RBC QN AUTO: 31.6 PG (ref 26–34)
MCHC RBC AUTO-ENTMCNC: 34.4 G/DL (ref 31–37)
MCV RBC AUTO: 91.8 FL
PLATELET # BLD AUTO: 236 10(3)UL (ref 150–450)
RBC # BLD AUTO: 4.5 X10(6)UL
WBC # BLD AUTO: 5.4 X10(3) UL (ref 4–11)

## 2024-08-14 PROCEDURE — 36415 COLL VENOUS BLD VENIPUNCTURE: CPT

## 2024-08-14 PROCEDURE — 85027 COMPLETE CBC AUTOMATED: CPT

## 2024-08-23 ENCOUNTER — ANESTHESIA EVENT (OUTPATIENT)
Dept: SURGERY | Facility: HOSPITAL | Age: 59
End: 2024-08-23
Payer: COMMERCIAL

## 2024-08-23 ENCOUNTER — HOSPITAL ENCOUNTER (OUTPATIENT)
Facility: HOSPITAL | Age: 59
Setting detail: HOSPITAL OUTPATIENT SURGERY
Discharge: HOME | End: 2024-08-23
Attending: OBSTETRICS & GYNECOLOGY | Admitting: OBSTETRICS & GYNECOLOGY
Payer: COMMERCIAL

## 2024-08-23 ENCOUNTER — ANESTHESIA (OUTPATIENT)
Dept: SURGERY | Facility: HOSPITAL | Age: 59
End: 2024-08-23
Payer: COMMERCIAL

## 2024-08-23 VITALS
RESPIRATION RATE: 16 BRPM | SYSTOLIC BLOOD PRESSURE: 132 MMHG | TEMPERATURE: 97 F | HEART RATE: 56 BPM | HEIGHT: 70 IN | DIASTOLIC BLOOD PRESSURE: 79 MMHG | BODY MASS INDEX: 31.12 KG/M2 | OXYGEN SATURATION: 98 % | WEIGHT: 217.38 LBS

## 2024-08-23 DIAGNOSIS — Z01.818 PRE-OP TESTING: Primary | ICD-10-CM

## 2024-08-23 PROBLEM — R93.89 THICKENED ENDOMETRIUM: Status: ACTIVE | Noted: 2024-08-23

## 2024-08-23 PROCEDURE — 0UB98ZZ EXCISION OF UTERUS, VIA NATURAL OR ARTIFICIAL OPENING ENDOSCOPIC: ICD-10-PCS | Performed by: OBSTETRICS & GYNECOLOGY

## 2024-08-23 PROCEDURE — 0UDB7ZZ EXTRACTION OF ENDOMETRIUM, VIA NATURAL OR ARTIFICIAL OPENING: ICD-10-PCS | Performed by: OBSTETRICS & GYNECOLOGY

## 2024-08-23 PROCEDURE — 88305 TISSUE EXAM BY PATHOLOGIST: CPT | Performed by: OBSTETRICS & GYNECOLOGY

## 2024-08-23 RX ORDER — HYDROMORPHONE HYDROCHLORIDE 1 MG/ML
0.4 INJECTION, SOLUTION INTRAMUSCULAR; INTRAVENOUS; SUBCUTANEOUS EVERY 5 MIN PRN
OUTPATIENT
Start: 2024-08-23 | End: 2024-08-23

## 2024-08-23 RX ORDER — ACETAMINOPHEN 500 MG
1000 TABLET ORAL ONCE
Status: DISCONTINUED | OUTPATIENT
Start: 2024-08-23 | End: 2024-08-23 | Stop reason: HOSPADM

## 2024-08-23 RX ORDER — HYDROMORPHONE HYDROCHLORIDE 1 MG/ML
0.2 INJECTION, SOLUTION INTRAMUSCULAR; INTRAVENOUS; SUBCUTANEOUS EVERY 5 MIN PRN
OUTPATIENT
Start: 2024-08-23 | End: 2024-08-23

## 2024-08-23 RX ORDER — NALOXONE HYDROCHLORIDE 0.4 MG/ML
80 INJECTION, SOLUTION INTRAMUSCULAR; INTRAVENOUS; SUBCUTANEOUS AS NEEDED
OUTPATIENT
Start: 2024-08-23 | End: 2024-08-23

## 2024-08-23 RX ORDER — SODIUM CHLORIDE, SODIUM LACTATE, POTASSIUM CHLORIDE, CALCIUM CHLORIDE 600; 310; 30; 20 MG/100ML; MG/100ML; MG/100ML; MG/100ML
INJECTION, SOLUTION INTRAVENOUS CONTINUOUS
OUTPATIENT
Start: 2024-08-23

## 2024-08-23 RX ORDER — LABETALOL HYDROCHLORIDE 5 MG/ML
10 INJECTION, SOLUTION INTRAVENOUS EVERY 10 MIN PRN
OUTPATIENT
Start: 2024-08-23

## 2024-08-23 RX ORDER — SODIUM CHLORIDE, SODIUM LACTATE, POTASSIUM CHLORIDE, CALCIUM CHLORIDE 600; 310; 30; 20 MG/100ML; MG/100ML; MG/100ML; MG/100ML
INJECTION, SOLUTION INTRAVENOUS CONTINUOUS
Status: DISCONTINUED | OUTPATIENT
Start: 2024-08-23 | End: 2024-08-23

## 2024-08-23 RX ORDER — HYDROCODONE BITARTRATE AND ACETAMINOPHEN 5; 325 MG/1; MG/1
1 TABLET ORAL ONCE AS NEEDED
OUTPATIENT
Start: 2024-08-23 | End: 2024-08-23

## 2024-08-23 RX ORDER — PROCHLORPERAZINE EDISYLATE 5 MG/ML
5 INJECTION INTRAMUSCULAR; INTRAVENOUS EVERY 8 HOURS PRN
OUTPATIENT
Start: 2024-08-23

## 2024-08-23 RX ORDER — HYDROCODONE BITARTRATE AND ACETAMINOPHEN 5; 325 MG/1; MG/1
2 TABLET ORAL ONCE AS NEEDED
OUTPATIENT
Start: 2024-08-23 | End: 2024-08-23

## 2024-08-23 RX ORDER — ACETAMINOPHEN 500 MG
1000 TABLET ORAL ONCE AS NEEDED
OUTPATIENT
Start: 2024-08-23 | End: 2024-08-23

## 2024-08-23 RX ORDER — SCOLOPAMINE TRANSDERMAL SYSTEM 1 MG/1
1 PATCH, EXTENDED RELEASE TRANSDERMAL ONCE
Status: DISCONTINUED | OUTPATIENT
Start: 2024-08-23 | End: 2024-08-23 | Stop reason: HOSPADM

## 2024-08-23 RX ORDER — ONDANSETRON 2 MG/ML
4 INJECTION INTRAMUSCULAR; INTRAVENOUS EVERY 6 HOURS PRN
OUTPATIENT
Start: 2024-08-23

## 2024-08-23 RX ORDER — HYDROMORPHONE HYDROCHLORIDE 1 MG/ML
0.6 INJECTION, SOLUTION INTRAMUSCULAR; INTRAVENOUS; SUBCUTANEOUS EVERY 5 MIN PRN
OUTPATIENT
Start: 2024-08-23 | End: 2024-08-23

## 2024-08-23 RX ORDER — LIDOCAINE HYDROCHLORIDE 10 MG/ML
INJECTION, SOLUTION EPIDURAL; INFILTRATION; INTRACAUDAL; PERINEURAL AS NEEDED
Status: DISCONTINUED | OUTPATIENT
Start: 2024-08-23 | End: 2024-08-23 | Stop reason: SURG

## 2024-08-23 RX ADMIN — LIDOCAINE HYDROCHLORIDE 25 MG: 10 INJECTION, SOLUTION EPIDURAL; INFILTRATION; INTRACAUDAL; PERINEURAL at 13:52:00

## 2024-08-23 RX ADMIN — SODIUM CHLORIDE, SODIUM LACTATE, POTASSIUM CHLORIDE, CALCIUM CHLORIDE: 600; 310; 30; 20 INJECTION, SOLUTION INTRAVENOUS at 14:24:00

## 2024-08-23 RX ADMIN — SODIUM CHLORIDE, SODIUM LACTATE, POTASSIUM CHLORIDE, CALCIUM CHLORIDE: 600; 310; 30; 20 INJECTION, SOLUTION INTRAVENOUS at 13:52:00

## 2024-08-23 NOTE — ANESTHESIA PREPROCEDURE EVALUATION
PRE-OP EVALUATION    Patient Name: Pamela Pearson    Admit Diagnosis: THICKENED ENDOMETRIUM    Pre-op Diagnosis: THICKENED ENDOMETRIUM    HYSTEROSCOPY DILATION AND CURETTAGE POSSIBLE POLYPECTOMY    Anesthesia Procedure: HYSTEROSCOPY DILATION AND CURETTAGE POSSIBLE POLYPECTOMY    Surgeons and Role:     * Sridevi Hendricks MD - Primary    Pre-op vitals reviewed.  Temp: 97.9 °F (36.6 °C)  Pulse: 66  Resp: 16  BP: 141/93  SpO2: 99 %  Body mass index is 31.19 kg/m².    Current medications reviewed.  Hospital Medications:   acetaminophen (Tylenol Extra Strength) tab 1,000 mg  1,000 mg Oral Once    scopolamine (Transderm-Scop) 1 MG/3DAYS patch 1 patch  1 patch Transdermal Once    lactated ringers infusion   Intravenous Continuous       Outpatient Medications:     Medications Prior to Admission   Medication Sig Dispense Refill Last Dose    Ergocalciferol (VITAMIN D OR) Take by mouth.   Past Week    levothyroxine 50 MCG Oral Tab Take 1 tablet (50 mcg total) by mouth before breakfast.   8/23/2024    latanoprost 0.005 % Ophthalmic Solution Place 1 drop into both eyes nightly. 7.5 mL 3 8/23/2024    NP THYROID 120 MG Oral Tab Take 1 tablet (120 mg total) by mouth daily.   8/23/2024       Allergies: Aspirin, Gluten meal, Ib pro [ibuprofen], Nizoral [ketoconazole], and Amoxicillin-na benzoate      Anesthesia Evaluation    Patient summary reviewed.    Anesthetic Complications  (-) history of anesthetic complications         GI/Hepatic/Renal    Negative GI/hepatic/renal ROS.                             Cardiovascular    Negative cardiovascular ROS.    Exercise tolerance: good     MET: >4                                           Endo/Other           (+) hypothyroidism    (+) anemia                   Pulmonary    Negative pulmonary ROS.                       Neuro/Psych                                      Past Surgical History:   Procedure Laterality Date    Arthroscopy of joint unlisted  02/2012    Right knee     Cholecystectomy      Hernia surgery      umbical hernia mesh          x 3      Social History     Socioeconomic History    Marital status:    Tobacco Use    Smoking status: Never    Smokeless tobacco: Never   Vaping Use    Vaping status: Never Used   Substance and Sexual Activity    Alcohol use: Yes     Comment: OCCASIONAL    Drug use: No    Sexual activity: Yes     Partners: Male     Birth control/protection: Vasectomy     History   Drug Use No     Available pre-op labs reviewed.  Lab Results   Component Value Date    WBC 5.4 2024    RBC 4.50 2024    HGB 14.2 2024    HCT 41.3 2024    MCV 91.8 2024    MCH 31.6 2024    MCHC 34.4 2024    RDW 12.6 2024    .0 2024               Airway      Mallampati: II  Mouth opening: 3 FB  TM distance: > 6 cm  Neck ROM: full Cardiovascular    Cardiovascular exam normal.  Rhythm: regular  Rate: normal  (-) murmur   Dental    Dentition appears grossly intact         Pulmonary    Pulmonary exam normal.  Breath sounds clear to auscultation bilaterally.               Other findings              ASA: 2   Plan: MAC  NPO status verified and patient meets guidelines.        Comment: MAC discussed.  All questions answered.  Patient expressed understanding and agrees to proceed.    Plan/risks discussed with: patient and child/children                Present on Admission:  **None**

## 2024-08-23 NOTE — DISCHARGE INSTRUCTIONS
Hysteroscopy and Dilatation and Curettage  Endometrial Ablation    Fide Barba, Mark Anthony, Eh Clancy Sayla, Schreiber, Ruthie, Deidra, Weeks    After surgery you may have some light vaginal bleeding. This may last up to a week and is not a concern unless it is heavier than a menstrual period. If you had an endometrial ablation, you can expect a watery discharge which may last for up to 4-6 weeks.    You should avoid tampons for the first week after surgery. Also no intercourse for one week after a D and C and two weeks after an ablation.    You may experience cramping the day/evening of surgery. This is usually relieved with over the counter Acetaminophen (Tylenol), Ibuprofen (Motrin or Advil) or Naprosyn (Aleve). A prescription pain medicine may be ordered by your physician. You may also be given a medication for possible nausea.    You should rest the day of surgery. No driving for 24 hours after general anesthesia or sedation or if you are taking prescription pain medications. You may resume normal activities the next day. Showering is fine the day after surgery. Exercise is fine the next day if you are comfortable doing it.    You may resume your normal diet once your appetite returns after surgery.  Some patients will experience nausea from anesthesia or narcotics: we recommend you start with a light diet. Do not drink alcohol or use other sedating medications (sleep aids, sedating cold medications) if taking prescription pain medications. Resume your normal medications as instructed.    Please call our office if you experience any of the following symptoms:  Temperature over 100.5 degrees  Vaginal bleeding heavier than a moderate period  Severe abdominal/pelvic pain  Shortness of breath or chest pain  New onset of leg pain and/or swelling    If a specimen was sent to pathology, you can expect a call from your doctor within 10 days with the report.   If your doctor requested a post op  appointment, please call to schedule an appointment for 2 weeks post op.    Please call with any other questions or concerns.    Office Number: [phone]

## 2024-08-23 NOTE — OPERATIVE REPORT
OPERATIVE REPORTS    Date of surgery:  2024    Pre op Diagnosis: Thickened Endometrium    Post op Diagnosis: same, endometrial polyp    Procedure: Hysteroscopy with D&C and polypectomy    Surgeon: Ruthie    Assistant:None    Anesthesia: MAC    EBL:2     Specimen:  endometrial curettings      History: Patient is a 58 year old female,  3, para 3, LMP years ago with history of  thickened endometrium on US. Preop evaluation has included  inconclusive EmBx. She presents now for hysteroscopy and D and C.    Findings: Exam under anesthesia revealed AV uterus. Adnexa were not palpated. Under the hysteroscope  large fundal polyp. No submucosal fibroids  seen. Bilateral ostii were  visualized.     Procedure: The patient was prepped and draped in the usual sterile fashion after satisfactory anesthesia was obtained. The bladder was catheterized yielding 200 cc of clear david urine. The patient was examined with the findings as noted above. A weighted speculum was placed in the vagina and the anterior lip of the cervix was grasped with a single tooth tenaculum and brought forward. With continuous saline infusion, the cervix was hydrodilated as a 5 mm hysteroscope was inserted.  Hysteroscopy was performed with continuous saline infusion with the findings as noted above.    The hysteroscope was removed and the cervix was further dilated up to 8 mm. Sharp endometrial curettage was performed yielding large polyp and small  amount of tissue.  Specimen was sent to pathology.  There was no evidence of uterine perforation. Instruments were removed from the vagina. The tenaculum marks were hemostatic. Patient was awakened and taken to the recovery room in good condition.

## 2024-08-23 NOTE — ANESTHESIA POSTPROCEDURE EVALUATION
[facility]    Pamela Pearson Patient Status:  Hospital Outpatient Surgery   Age/Gender 58 year old female MRN [id number]   Location [facility] PERIOPERATIVE SERVICE Attending Sridevi Hendricks MD   Hosp Day # 0 PCP Sherri Hernandez MD       Anesthesia Post-op Note    HYSTEROSCOPY DILATION AND CURETTAGE; POLYPECTOMY    Procedure Summary       Date: 08/23/24 Room / Location:  MAIN OR 02 / [facility] MAIN OR    Anesthesia Start: 1348 Anesthesia Stop: 1424    Procedure: HYSTEROSCOPY DILATION AND CURETTAGE; POLYPECTOMY Diagnosis: (THICKENED ENDOMETRIUM)    Surgeons: Sridevi Hendricks MD Anesthesiologist: Mehul Granado MD    Anesthesia Type: MAC ASA Status: 2            Anesthesia Type: MAC    Vitals Value Taken Time   /86 08/23/24 1425   Temp 97 08/23/24 1425   Pulse 55 08/23/24 1425   Resp 16 08/23/24 1425   SpO2 97 08/23/24 1425       Patient Location: Same Day Surgery    Anesthesia Type: MAC    Airway Patency: patent    Postop Pain Control: adequate    Mental Status: preanesthetic baseline    Nausea/Vomiting: none    Cardiopulmonary/Hydration status: stable euvolemic    Complications: no apparent anesthesia related complications    Postop vital signs: stable    Dental Exam: Unchanged from Preop    Patient to be discharged home when criteria met.

## 2024-08-23 NOTE — H&P
SUBJECTIVE:  Reason for Admission: Thickened endometrium    History of Present Illness: Patient is a 58 year old female /LMP  female  Patient with some vaginal spotting, US showed thickened endometrium  11 mm.  EmBx inconclusive.  H/o endometrial thickening in past , benign Bx now persistent thickening for definitive pathology    Medications Prior to Admission   Medication Sig Dispense Refill Last Dose    Ergocalciferol (VITAMIN D OR) Take by mouth.   Past Week    levothyroxine 50 MCG Oral Tab Take 1 tablet (50 mcg total) by mouth before breakfast.   2024    latanoprost 0.005 % Ophthalmic Solution Place 1 drop into both eyes nightly. 7.5 mL 3 2024    NP THYROID 120 MG Oral Tab Take 1 tablet (120 mg total) by mouth daily.   2024       Allergies   Allergen Reactions    Aspirin HIVES    Gluten Meal OTHER (SEE COMMENTS)     GLUTEN FREE    Ib Pro [Ibuprofen] HIVES    Nizoral [Ketoconazole] UNKNOWN    Amoxicillin-Na Benzoate RASH        Past Medical History:    ALLERGIC RHINITIS    Disorder of thyroid    Hashimoto disease    MITRAL VALVE PROLAPSE    no insufficiency    OBESITY    Osteoarthritis    RIGHT KNEE    OTHER DISEASES    elevated ocular pressure    Plantar fasciitis         Raynaud's disease         Uterine fibroid         Visual impairment    READING GLASSES       Past Surgical History   Past Surgical History:   Procedure Laterality Date    Arthroscopy of joint unlisted  2012    Right knee    Cholecystectomy      Hernia surgery      umbical hernia mesh          x 3        Past OB History  OB History    Para Term  AB Living   3 3 3     3   SAB IAB Ectopic Multiple Live Births           3      # Outcome Date GA Lbr David/2nd Weight Sex Type Anes PTL Lv   3 Term  38w0d  7 lb 11 oz (3.487 kg) F NORMAL SPONT   GARY   2 Term  39w0d  8 lb 12 oz (3.969 kg) F NORMAL SPONT   GARY   1 Term  40w0d  8 lb 14 oz (4.026 kg) M NORMAL SPONT   GARY       Past GYN History as  above       Social History  Social History     Tobacco Use    Smoking status: Never    Smokeless tobacco: Never   Substance Use Topics    Alcohol use: Yes     Comment: OCCASIONAL        Review of Systems  Pertinent items are noted in HPI.    OBJECTIVE:  Patient Vitals for the past 24 hrs:   BP Temp Temp src Pulse Resp SpO2 Weight   08/23/24 1145 (!) 141/93 97.9 °F (36.6 °C) Oral 66 16 99 % 217 lb 6.4 oz (98.6 kg)     No intake or output data in the 24 hours ending 08/23/24 1308    BP (!) 141/93 (BP Location: Right arm)   Pulse 66   Temp 97.9 °F (36.6 °C) (Oral)   Resp 16   Ht 5' 10\" (1.778 m)   Wt 217 lb 6.4 oz (98.6 kg)   LMP  (LMP Unknown)   SpO2 99%   BMI 31.19 kg/m²   General appearance: alert, appears stated age, and cooperative  Lungs: clear to auscultation bilaterally  Heart: regular rate and rhythm  Extremities: extremities normal, atraumatic, no cyanosis or edema    Diagnostics: US lining 11 mm, few fibroids      Data Review: EmBx superficial endometrium    ASSESSMENT:   female with thickened endometrium    PLAN:  For [facility] with D&C.  Procedure discussed at length with risks.  She demonstrates understanding and consents.

## 2024-11-21 ENCOUNTER — TELEPHONE (OUTPATIENT)
Dept: PHYSICAL MEDICINE AND REHAB | Facility: CLINIC | Age: 59
End: 2024-11-21

## 2024-11-21 NOTE — TELEPHONE ENCOUNTER
Patient calling to request to speak to a nurse as she wants to know if she can schedule another R-Hip Inj.

## 2024-11-22 NOTE — TELEPHONE ENCOUNTER
Per LOV notes, patient was to f/u in 2 months. At that time she decided to hold off on her injections. Patient advised to schedule f/u appt.

## 2024-12-05 ENCOUNTER — OFFICE VISIT (OUTPATIENT)
Dept: PHYSICAL MEDICINE AND REHAB | Facility: CLINIC | Age: 59
End: 2024-12-05
Payer: COMMERCIAL

## 2024-12-05 ENCOUNTER — TELEPHONE (OUTPATIENT)
Dept: PHYSICAL MEDICINE AND REHAB | Facility: CLINIC | Age: 59
End: 2024-12-05

## 2024-12-05 VITALS — BODY MASS INDEX: 31 KG/M2 | WEIGHT: 217 LBS

## 2024-12-05 DIAGNOSIS — M79.10 MYALGIA: ICD-10-CM

## 2024-12-05 DIAGNOSIS — R26.9 GAIT ABNORMALITY: ICD-10-CM

## 2024-12-05 DIAGNOSIS — M17.10 PRIMARY OSTEOARTHRITIS OF KNEE, UNSPECIFIED LATERALITY: ICD-10-CM

## 2024-12-05 DIAGNOSIS — M16.11 PRIMARY OSTEOARTHRITIS OF RIGHT HIP: Primary | ICD-10-CM

## 2024-12-05 DIAGNOSIS — M22.2X9 PATELLOFEMORAL PAIN SYNDROME, UNSPECIFIED LATERALITY: ICD-10-CM

## 2024-12-05 RX ORDER — TRIAMCINOLONE ACETONIDE 40 MG/ML
40 INJECTION, SUSPENSION INTRA-ARTICULAR; INTRAMUSCULAR ONCE
Status: COMPLETED | OUTPATIENT
Start: 2024-12-05 | End: 2024-12-05

## 2024-12-05 RX ORDER — LIDOCAINE HYDROCHLORIDE 10 MG/ML
14 INJECTION, SOLUTION INFILTRATION; PERINEURAL ONCE
Status: COMPLETED | OUTPATIENT
Start: 2024-12-05 | End: 2024-12-05

## 2024-12-05 NOTE — PROGRESS NOTES
[facility]  Progress Note    CHIEF COMPLAINT:    Chief Complaint   Patient presents with    Follow - Up     Lov 24 Right hip pain.Pain 4/10.No T/N. Continues HEP with no relief. Tylenol with some relief. Patient will like to talk about PRP .        History of Present Illness:  The patient is a 59 year old  right-handed female with significant past medical history of RIGHT hip and knee osteoarthritis who presents with right hip discomfort and gait abnormality with weakness in the right leg.  She is status post right hip corticosteroid injection on 2024 with about 80% improvement lasted for about 7 to 8 weeks.  Today she rates her pain a 4 out of 10 in the right hip and groin region.  She denies any radicular symptoms.  She is also having mild right knee pain.  She has been taking Tylenol as needed for pain and has been compliant with a home exercise program.  She would like to discuss PRP as well.    PAST MEDICAL HISTORY:  Past Medical History:    ALLERGIC RHINITIS    Disorder of thyroid    Hashimoto disease    MITRAL VALVE PROLAPSE    no insufficiency    OBESITY    Osteoarthritis    RIGHT KNEE    OTHER DISEASES    elevated ocular pressure    Plantar fasciitis         Raynaud's disease         Uterine fibroid         Visual impairment    READING GLASSES       SURGICAL HISTORY:  Past Surgical History:   Procedure Laterality Date    Arthroscopy of joint unlisted  2012    Right knee    Cholecystectomy      Hernia surgery      umbical hernia mesh          x 3        SOCIAL HISTORY:   Social History     Occupational History    Not on file   Tobacco Use    Smoking status: Never    Smokeless tobacco: Never   Vaping Use    Vaping status: Never Used   Substance and Sexual Activity    Alcohol use: Yes     Comment: OCCASIONAL    Drug use: No    Sexual activity: Yes     Partners: Male     Birth control/protection: Vasectomy       FAMILY HISTORY:   Family  History   Problem Relation Age of Onset    Other (abdominal aortic aneurysm[other]) Mother     Lipids Mother     Hypertension Mother     Heart Disorder Mother         CABG age 60    Cancer Maternal Grandfather         bone    Cancer Paternal Grandfather         bone    Kidney Disease Father        CURRENT MEDICATIONS:   Current Outpatient Medications   Medication Sig Dispense Refill    Ergocalciferol (VITAMIN D OR) Take by mouth.      levothyroxine 50 MCG Oral Tab Take 1 tablet (50 mcg total) by mouth before breakfast.      latanoprost 0.005 % Ophthalmic Solution Place 1 drop into both eyes nightly. 7.5 mL 3    NP THYROID 120 MG Oral Tab Take 1 tablet (120 mg total) by mouth daily.         ALLERGIES:   Allergies[1]    REVIEW OF SYSTEMS:   Review of Systems   Constitutional: Negative.    HENT: Negative.    Eyes: Negative.    Respiratory: Negative.    Cardiovascular: Negative.    Gastrointestinal: Negative.    Genitourinary: Negative.    Musculoskeletal: As per HPI  Skin: Negative.    Neurological: As per HPI  Endo/Heme/Allergies: Negative.    Psychiatric/Behavioral: Negative.      All other systems reviewed and are negative. Pertinent positives and negatives noted in the HPI.        PHYSICAL EXAM:   Wt 217 lb (98.4 kg)   LMP  (LMP Unknown)   BMI 31.14 kg/m²     Body mass index is 31.14 kg/m².      General: No immediate distress  Head: Normocephalic/ Atraumatic  Eyes: Extra-occular movements intact.   Ears: No auricular hematoma or deformities  Mouth: No lesions or ulcerations  Heart: peripheral pulses intact. Normal capillary refill.   Lungs: Non-labored respirations  Abdomen: No abdominal guarding  Extremities: No lower extremity edema bilaterally   Skin: No lesions noted.   Cognition: alert & oriented x 3, attentive, able to follow 2 step commands, comprehention intact, spontaneous speech intact  Motor:    Musculoskeletal:        Data  Admission on 08/23/2024, Discharged on 08/23/2024   Component Date Value Ref  Range Status    Case Report 08/23/2024    Final                    Value:Surgical Pathology                                Case: [id number]                                   Authorizing Provider:  Sridevi Hendricks MD       Collected:           08/23/2024 02:08 PM          Ordering Location:     [facility]    Received:            08/23/2024 04:07 PM          Pathologist:           Farhan Mclain MD                                                             Specimen:    Endometrial curettage, Endometrial currettings                                             Final Diagnosis: 08/23/2024    Final                    Value:Endometrial curetting:                          -Endometrial polyp.                          -Squamous and endocervical epithelium with no significant pathologic                           change.                          -Negative for malignancy.                                                        Clinical Information 08/23/2024    Final                    Value:Thickened Endometrium. Endometrial polyp.     Gross Description 08/23/2024    Final                    Value:Received in formalin labeled with the patient's name and \" endometrial                           curettings\". It consists of a 2.8 x 1.8 x 0.3 cm aggregate of soft                           tan-pink to red tissue admixed with mucus and blood clots, received on                           multiple Telfa pads. The specimen is submitted entirely in A1.                                                     (TB)   Laboratory Encounter on 08/14/2024   Component Date Value Ref Range Status    WBC 08/14/2024 5.4  4.0 - 11.0 x10(3) uL Final    RBC 08/14/2024 4.50  3.80 - 5.30 x10(6)uL Final    HGB 08/14/2024 14.2  12.0 - 16.0 g/dL Final    HCT 08/14/2024 41.3  35.0 - 48.0 % Final    MCV 08/14/2024 91.8  80.0 - 100.0 fL Final    MCH 08/14/2024 31.6  26.0 - 34.0 pg Final    MCHC 08/14/2024 34.4  31.0 - 37.0 g/dL Final    RDW 08/14/2024  12.6  11.0 - 15.0 % Final    RDW-SD 08/14/2024 42.4  35.1 - 46.3 fL Final    PLT 08/14/2024 236.0  150.0 - 450.0 10(3)uL Final   ]      Radiology Imaging:  I reviewed with the patient her MRI of the hip right from 5/2/2024  MRI HIPS, RIGHT (CPT=73721)  Narrative: PROCEDURE: MRI HIPS, RIGHT (CPT=73721)     COMPARISON: External Exams, XR HIP, UNILATERAL, COMPLETE (MIN2 VIEWS), RIGHT (CPT=73510), 5/24/2023, 2:30 PM.     INDICATIONS: M17.10 Primary osteoarthritis of knee, unspecified laterality M22.2X9 Patellofemoral pain syndrome, unspecified laterality R26.9 Gait abnormality M79.10 Myalgi*     TECHNIQUE: A comprehensive examination was performed utilizing a variety of imaging planes and imaging parameters to optimize visualization of suspected pathology.  Images were performed without contrast.     FINDINGS:   FEMORAL HEAD: Full-thickness chondral loss with lateral femoral head neck osteophytes. Tiny subchondral cysts seen within the superior lateral femoral head. No acute fracture, dislocation or marrow replacing lesion.  ACETABULUM: Full-thickness chondral loss within the acetabulum with lateral acetabular osteophytes. Tiny subchondral cysts seen within the superior acetabulum. No acute fracture, dislocation or marrow replacing lesion.  OTHER BONES: Degenerative changes are seen within the sacroiliac joints. Degenerative disc and facet disease is partially visualized in the lower lumbar spine. Degenerative changes are seen at the pubic symphysis.  LABRUM: There is a linear cleft of high signal under cutting the base of the anterior and superior labrum at their acetabular attachments consistent with nondisplaced degenerative tears.  EFFUSIONS: Small right hip joint effusion with synovitis  BURSAE: There is thickening with increased fluid in the trochanteric bursa.  TENDONS: Thickening with increased signal of the gluteus minimus and medius at the greater trochanter. No full-thickness or retracted tear.  MUSCLES: No  tear or strain.  No inappropriate atrophy.    OTHER: 3.8 cm left uterine fibroid.              Impression: CONCLUSION:      Moderate right hip osteoarthritis.     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     Small right hip joint effusion with synovitis.    .           Dictated by (CST): River Edouard MD on 5/02/2024 at 2:59 PM       Finalized by (CST): River Edouard MD on 5/02/2024 at 3:11 PM              ASSESSMENT AND PLAN:  Pamela is a pleasant 59-year-old female presents with right hip pain which I believe is due to osteoarthritis.  I am recommending a right hip corticosteroid injection which we performed today.  Please see separate procedure note.  As a pertains to PRP, we have had a discussion regarding this including what it is and where I find it to be beneficial as a pertains to her case.  She should continue using Tylenol and follow-up with me in 2 months.  We can consider PRP injection in the future.       RTC in 2 months  Discharge Instructions were provided as documented in AVS summary.  The patient was in agreement with the assessment and plan.  All questions were answered.  There were no barriers to learning.         1. Primary osteoarthritis of right hip    2. Myalgia    3. Gait abnormality    4. Patellofemoral pain syndrome, unspecified laterality    5. Primary osteoarthritis of knee, unspecified laterality        Alex B. Behar MD  Physical Medicine and Rehabilitation/Sports Medicine  [facility]         [1]   Allergies  Allergen Reactions    Aspirin HIVES    Gluten Meal OTHER (SEE COMMENTS)     GLUTEN FREE    Ib Pro [Ibuprofen] HIVES    Nizoral [Ketoconazole] UNKNOWN    Amoxicillin-Na Benzoate RASH

## 2024-12-05 NOTE — PATIENT INSTRUCTIONS
Per our discussion, here is some information on regenerative medicine procedures which may be of consideration for your diagnosis.     Regenerative medicine / cellular procedures are increasing in use in orthopedics over the past ten years.  These procedures look to stimulate your body to help with healing.  Research in this area is ongoing and techniques are rapidly evolving.        The most common regenerative procedure is a platelet rich plasma (PRP) injection. These injections have been popularized in athletics where high levels of recovery are necessary for optimal performance.   Below is some basic information    PRP- Platelet Rich Plasma     Platelet Rich Plasma is generally referred to as PRP.  PRP uses concentrated platelets from the patient’s own blood to harness the body’s ability to heal through the aid of growth factors.  Studies have shown that the increased concentration of growth factors in PRP can help ignite healing  speed up the process.       How it Works:  Tendons and ligaments have poor blood supply and have less effective healing than other tissues.  This can lead to chronic pain and instability.  Platelets are found in the blood and are primarily known for stopping bleeding when you get cut.  As part of their function, platelets have large reservoirs of growth factors to initiate and accelerate tissue repair and regeneration.  They stimulate wound healing of connective tissue (tendons and ligaments), bone regeneration and repair, and promotion of new blood vessels.     How it's Done  The blood is taken from the individual and processed on the SAME DAY.  Platelets are  out and then concentrated many fold.  The PRP made from the patient is placed into the damaged area that had been identified by use of image guidance (musculoskeletal ultrasound  it is important to have good visualization with the ultrasound for prp to get a good response.  This stimulates a healing cascade.      Healing Process  Unlike steroids or cortisone shots, PRP stimulates and modulates the necessary healing within the body.  It is very important to avoid anti-inflammatory medications such as Motrin or Aleve as they can interfere with the healing process before a prp injection.  Generally, the amount of activity the patient did prior to the procedure, the patient will hopefully be able to do during the month after the procedure, though this will vary depending on the diagnosis.   Activity levels should increase as the patient heals.  During this time, a short course of Physical Therapy may be advised.     PRP does not result in an “instant fix” even if the pain is improved or resolved.  The healing process takes time (usually 6-8 weeks) and the patient will be monitored for a return to hopefully full, unrestricted activities.  Again this will depend on the diagnosis      Potential Benefits-   PRP works to reduce the following:  Reduction in pain  Restore normal function by causing a healing respones  Reduce the need for prolonged physical therapy  Reduce the chance of Surgery   Avoid Inactivity and Deconditioning     There are other regenerative procedures that use \"stronger\" cells by collecting and concentrating them from your own fat or bone marrow.  These are less commonly used but are growing in popularity and are under much further study.     If you want to learn more about prp or other regenerative procedures, and to see if it is an option for you, we would recommend you schedule and appointment at [facility] with one of our sports medicine physicians who focuse their practice on this area.  You can reach him at the following:    [place] [address], [address] [phone]            Post Injection Instructions     Please do not do anything strenuous over the next two days (if you had a knee injection do not walk more than 2 city  blocks, do not attend any aerobic classes, do not run, no heavy lifting, no prolong standing).  You may resume your day to day activities after your injection.  You may experience some mild amount of swelling after the procedure.  Please ice your joint that was injected at least 5-6 times a day (15 minutes) for two days after (this will help prevent worsening pain that sometimes occurs after an injection).  Only take tylenol if needed for pain for the first few days.  Watch for signs of infection which include redness, warmth, worsening pain, fevers or chills.  If you develop any of these signs call the office immediately at [phone]    Everyone responds differently to injections, but you can expect your peak effects a few weeks after your last injection.  Alex B. Behar MD  Physical Medicine and Rehabilitation/Sports Medicine  [facility]      1) Tylenol 500-1000 mg every 6-8 hours as needed for pain.  No more than 3000 mg daily.  2) Continue home exercise program   3) follow-up with me in 2 months

## 2024-12-05 NOTE — PROCEDURES
[facility]   Hip Injection Procedure Note    CHIEF COMPLAINT:    Chief Complaint   Patient presents with    Follow - Up     Lov 7/2/24 Right hip pain.Pain 4/10.No T/N. Continues HEP with no relief. Tylenol with some relief. Patient will like to talk about PRP .        PROCEDURE PERFORMED:  Right hip  corticosteroid injection under ultrasound guidance    INDICATIONS:  Right hip pain and osteoarthritis    PRIMARY PROCEDURALIST:  Alex Behar, MD    INFORMED CONSENT & TIME OUT:   As documented in the Time Out and Pre-Procedure Check Lists.  Verbal consent was obtained    Vitals: [unfilled]  Labs (document last wbc, plts, hgb, and PT/INR):   Admission on 08/23/2024, Discharged on 08/23/2024   Component Date Value Ref Range Status    Case Report 08/23/2024    Final                    Value:Surgical Pathology                                Case: [id number]                                   Authorizing Provider:  Sridevi Hendricks MD       Collected:           08/23/2024 02:08 PM          Ordering Location:     [facility]    Received:            08/23/2024 04:07 PM          Pathologist:           Farhan Mclain MD                                                             Specimen:    Endometrial curettage, Endometrial currettings                                             Final Diagnosis: 08/23/2024    Final                    Value:Endometrial curetting:                          -Endometrial polyp.                          -Squamous and endocervical epithelium with no significant pathologic                           change.                          -Negative for malignancy.                                                        Clinical Information 08/23/2024    Final                    Value:Thickened Endometrium. Endometrial polyp.     Gross Description 08/23/2024    Final                    Value:Received in formalin labeled with the patient's name and \"  endometrial                           curettings\". It consists of a 2.8 x 1.8 x 0.3 cm aggregate of soft                           tan-pink to red tissue admixed with mucus and blood clots, received on                           multiple Telfa pads. The specimen is submitted entirely in A1.                                                     (TB)   Laboratory Encounter on 08/14/2024   Component Date Value Ref Range Status    WBC 08/14/2024 5.4  4.0 - 11.0 x10(3) uL Final    RBC 08/14/2024 4.50  3.80 - 5.30 x10(6)uL Final    HGB 08/14/2024 14.2  12.0 - 16.0 g/dL Final    HCT 08/14/2024 41.3  35.0 - 48.0 % Final    MCV 08/14/2024 91.8  80.0 - 100.0 fL Final    MCH 08/14/2024 31.6  26.0 - 34.0 pg Final    MCHC 08/14/2024 34.4  31.0 - 37.0 g/dL Final    RDW 08/14/2024 12.6  11.0 - 15.0 % Final    RDW-SD 08/14/2024 42.4  35.1 - 46.3 fL Final    PLT 08/14/2024 236.0  150.0 - 450.0 10(3)uL Final   ]    PROCEDURE:    The risks and benefits of intraarticular corticosteroid injection were again explained to the patient and verbal consent was obtained. The Right hip was prepped and draped in the usual sterile fashion. Using a curvilinear ultrasound transducer, the Right  femoro-acetabular joint was identified and prior to inserting the needle the patient's major vessels including the femoral artery and vein were visualized and found to be medial to the chosen approach. Approximately 6 cc of 1% lidocaine were used to anesthetize the skin and deep soft tissue under ultrasound guidance with a curvilinear probe and then a 22-gauge 3.5-inch needle was introduced into the anterolateral aspect of the femoral acetabular space.  Aspiration was attempted and there was no fluid able to be aspirated.  The distal aspect of the needle was visualized abutting the femoral neck and slightly retracted and4 mL of 1% lidocaine and 1 mL of 40 mg/mL Kenalog was injected into the intra-articular space. The hip capsule was seen to arise throughout the  injection.  The needle was then removed and hemostasis was obtained.  The skin site was cleansed and a clean dressing was applied. The patient tolerated the procedure well.     Patient verbalized understanding of assessment and plan.  Patient is in agreement with the plan.  All questions were answered.  No barriers to learning identified. Permanent pictures were saved.       INSTRUCTIONS GIVEN TO PATIENT:    \"You will see an effect in the next 2-3 days.  Please contact me if you have fevers, worsening swelling, worsening pain, decreased range of motion, increased redness, chills, or anything that makes you concerned about how the joint we injected feels/looks.  If you do not reach me in a reasonable time, please report directly to the emergency room for further evaluation\"        Alex B. Behar MD, [facility]  Physical Medicine and Rehabilitation/Sports Medicine  [facility]

## 2024-12-05 NOTE — TELEPHONE ENCOUNTER
Initiated authorization for RIGHT hip CSI under [place]. CPT/HCPCS 04182,  dx:M16.11 with Availity  Completed in the office    Status: Approved - no action required  Reference/Authorization # [id number]  Status: Authorization is not required based on medical necessity, however, is not a guarantee of payment and may be subject to review once claim is submitted-Covered Benefit.

## 2025-02-11 ENCOUNTER — OFFICE VISIT (OUTPATIENT)
Dept: PHYSICAL MEDICINE AND REHAB | Facility: CLINIC | Age: 60
End: 2025-02-11
Payer: COMMERCIAL

## 2025-02-11 VITALS — HEIGHT: 70 IN | BODY MASS INDEX: 31.07 KG/M2 | WEIGHT: 217 LBS

## 2025-02-11 DIAGNOSIS — M76.01 GLUTEAL TENDINITIS OF RIGHT BUTTOCK: ICD-10-CM

## 2025-02-11 DIAGNOSIS — S76.019A STRAIN OF GLUTEUS MEDIUS, UNSPECIFIED LATERALITY, INITIAL ENCOUNTER: ICD-10-CM

## 2025-02-11 DIAGNOSIS — E66.811 CLASS 1 OBESITY DUE TO EXCESS CALORIES WITH SERIOUS COMORBIDITY AND BODY MASS INDEX (BMI) OF 31.0 TO 31.9 IN ADULT: ICD-10-CM

## 2025-02-11 DIAGNOSIS — M70.62 GREATER TROCHANTERIC BURSITIS OF BOTH HIPS: ICD-10-CM

## 2025-02-11 DIAGNOSIS — M70.61 GREATER TROCHANTERIC BURSITIS OF BOTH HIPS: ICD-10-CM

## 2025-02-11 DIAGNOSIS — M54.16 LUMBAR RADICULOPATHY: ICD-10-CM

## 2025-02-11 DIAGNOSIS — M48.061 LUMBAR FORAMINAL STENOSIS: ICD-10-CM

## 2025-02-11 DIAGNOSIS — M79.10 MYALGIA: ICD-10-CM

## 2025-02-11 DIAGNOSIS — M47.816 LUMBAR SPONDYLOSIS: ICD-10-CM

## 2025-02-11 DIAGNOSIS — M16.11 PRIMARY OSTEOARTHRITIS OF RIGHT HIP: Primary | ICD-10-CM

## 2025-02-11 DIAGNOSIS — M51.369 BULGE OF LUMBAR DISC WITHOUT MYELOPATHY: ICD-10-CM

## 2025-02-11 DIAGNOSIS — R26.9 GAIT ABNORMALITY: ICD-10-CM

## 2025-02-11 DIAGNOSIS — E66.09 CLASS 1 OBESITY DUE TO EXCESS CALORIES WITH SERIOUS COMORBIDITY AND BODY MASS INDEX (BMI) OF 31.0 TO 31.9 IN ADULT: ICD-10-CM

## 2025-02-11 DIAGNOSIS — M54.59 MECHANICAL LOW BACK PAIN: ICD-10-CM

## 2025-02-11 PROCEDURE — 99214 OFFICE O/P EST MOD 30 MIN: CPT | Performed by: PHYSICAL MEDICINE & REHABILITATION

## 2025-02-11 PROCEDURE — 3008F BODY MASS INDEX DOCD: CPT | Performed by: PHYSICAL MEDICINE & REHABILITATION

## 2025-02-11 NOTE — PROGRESS NOTES
[facility]  Progress Note    CHIEF COMPLAINT:    Chief Complaint   Patient presents with    Follow - Up     INJ: 2024 pt comes in to f/u on Right hip corticosteroid injection. Denies improvement. Presents with weakness and tightness in R buttock and R thigh. Denies T/N. Denies weakness. Denies medication.       History of Present Illness:  The patient is a 59 year old right-handed female with significant past medical history of RIGHT hip and knee osteoarthritis who presents with right hip discomfort and gait abnormality with weakness in the right leg.  She is status post right hip corticosteroid injection on 2024 with limited improvement in her symptoms.  She is mostly complaining today of right buttock and lateral hip pain which she rates a 6-7 out of 10.  Her symptoms are aggravated with walking and standing.  She denies any further radicular symptoms down the right leg.  She denies any numbness or tingling.  No recent x-rays have been performed of the lumbar spine.  She has minimal right groin pain.  She has been working with a physical therapist and has also had massage therapy in the hip and groin area with limited improvement    PAST MEDICAL HISTORY:  Past Medical History:    ALLERGIC RHINITIS    Disorder of thyroid    Hashimoto disease    MITRAL VALVE PROLAPSE    no insufficiency    OBESITY    Osteoarthritis    RIGHT KNEE    OTHER DISEASES    elevated ocular pressure    Plantar fasciitis         Raynaud's disease         Uterine fibroid         Visual impairment    READING GLASSES       SURGICAL HISTORY:  Past Surgical History:   Procedure Laterality Date    Arthroscopy of joint unlisted  2012    Right knee    Cholecystectomy      Hernia surgery      umbical hernia mesh          x 3        SOCIAL HISTORY:   Social History     Occupational History    Not on file   Tobacco Use    Smoking status: Never    Smokeless tobacco: Never   Vaping Use     Vaping status: Never Used   Substance and Sexual Activity    Alcohol use: Yes     Comment: OCCASIONAL    Drug use: No    Sexual activity: Yes     Partners: Male     Birth control/protection: Vasectomy       FAMILY HISTORY:   Family History   Problem Relation Age of Onset    Other (abdominal aortic aneurysm[other]) Mother     Lipids Mother     Hypertension Mother     Heart Disorder Mother         CABG age 60    Cancer Maternal Grandfather         bone    Cancer Paternal Grandfather         bone    Kidney Disease Father        CURRENT MEDICATIONS:   Current Outpatient Medications   Medication Sig Dispense Refill    Ergocalciferol (VITAMIN D OR) Take by mouth.      levothyroxine 50 MCG Oral Tab Take 1 tablet (50 mcg total) by mouth before breakfast.      latanoprost 0.005 % Ophthalmic Solution Place 1 drop into both eyes nightly. 7.5 mL 3    NP THYROID 120 MG Oral Tab Take 1 tablet (120 mg total) by mouth daily.         ALLERGIES:   Allergies[1]    REVIEW OF SYSTEMS:   Review of Systems   Constitutional: Negative.    HENT: Negative.    Eyes: Negative.    Respiratory: Negative.    Cardiovascular: Negative.    Gastrointestinal: Negative.    Genitourinary: Negative.    Musculoskeletal: As per HPI  Skin: Negative.    Neurological: As per HPI  Endo/Heme/Allergies: Negative.    Psychiatric/Behavioral: Negative.      All other systems reviewed and are negative. Pertinent positives and negatives noted in the HPI.        PHYSICAL EXAM:   Ht 70\"   Wt 217 lb (98.4 kg)   LMP  (LMP Unknown)   BMI 31.14 kg/m²     Body mass index is 31.14 kg/m².      General: No immediate distress  Head: Normocephalic/ Atraumatic  Eyes: Extra-occular movements intact.   Ears: No auricular hematoma or deformities  Mouth: No lesions or ulcerations  Heart: peripheral pulses intact. Normal capillary refill.   Lungs: Non-labored respirations  Abdomen: No abdominal guarding  Extremities: No lower extremity edema bilaterally   Skin: No lesions noted.    Cognition: alert & oriented x 3, attentive, able to follow 2 step commands, comprehension intact, spontaneous speech intact  Motor:    Musculoskeletal:    LUMBAR SPINE:  Inspection: no erythema, swelling, or obvious deformity.  Their iliac crest and shoulder heights are symmetrical.  Postural exam reveals no scoliosis or kyphosis.  Palpation: Tender to palpation over the right lower lumbar facet and paraspinal, right glutes and piriformis, right gluteal tendons  ROM: Full active range of motion of the lumbar spine  Motor: 5/5 in all myotomes of the BILATERAL lower extremities except 4 out of 5 during right great toe extension  Sensation: Intact to light touch in all dermatomes of the lower extremities   Reflexes: /4 at L4 and S1  Facet Loading: no specific facet pain  TYRESE: Negative  Straight leg raise: negative for radicular pain symptoms  Slump test: negative for pain symptoms or radicular pain symptoms      Data  Admission on 08/23/2024, Discharged on 08/23/2024   Component Date Value Ref Range Status    Case Report 08/23/2024    Final                    Value:Surgical Pathology                                Case: [id number]                                   Authorizing Provider:  Sridevi Hendricks MD       Collected:           08/23/2024 02:08 PM          Ordering Location:     [facility]    Received:            08/23/2024 04:07 PM          Pathologist:           Farhan Mclain MD                                                             Specimen:    Endometrial curettage, Endometrial currettings                                             Final Diagnosis: 08/23/2024    Final                    Value:Endometrial curetting:                          -Endometrial polyp.                          -Squamous and endocervical epithelium with no significant pathologic                           change.                          -Negative for malignancy.                                                         Clinical Information 08/23/2024    Final                    Value:Thickened Endometrium. Endometrial polyp.     Gross Description 08/23/2024    Final                    Value:Received in formalin labeled with the patient's name and \" endometrial                           curettings\". It consists of a 2.8 x 1.8 x 0.3 cm aggregate of soft                           tan-pink to red tissue admixed with mucus and blood clots, received on                           multiple Telfa pads. The specimen is submitted entirely in A1.                                                     (TB)   Laboratory Encounter on 08/14/2024   Component Date Value Ref Range Status    WBC 08/14/2024 5.4  4.0 - 11.0 x10(3) uL Final    RBC 08/14/2024 4.50  3.80 - 5.30 x10(6)uL Final    HGB 08/14/2024 14.2  12.0 - 16.0 g/dL Final    HCT 08/14/2024 41.3  35.0 - 48.0 % Final    MCV 08/14/2024 91.8  80.0 - 100.0 fL Final    MCH 08/14/2024 31.6  26.0 - 34.0 pg Final    MCHC 08/14/2024 34.4  31.0 - 37.0 g/dL Final    RDW 08/14/2024 12.6  11.0 - 15.0 % Final    RDW-SD 08/14/2024 42.4  35.1 - 46.3 fL Final    PLT 08/14/2024 236.0  150.0 - 450.0 10(3)uL Final   ]      Radiology Imaging:  I reviewed with the patient her MRI of the hip right   MRI HIPS, RIGHT (CPT=73721)  Narrative: PROCEDURE: MRI HIPS, RIGHT (CPT=73721)     COMPARISON: External Exams, XR HIP, UNILATERAL, COMPLETE (MIN2 VIEWS), RIGHT (CPT=73510), 5/24/2023, 2:30 PM.     INDICATIONS: M17.10 Primary osteoarthritis of knee, unspecified laterality M22.2X9 Patellofemoral pain syndrome, unspecified laterality R26.9 Gait abnormality M79.10 Myalgi*     TECHNIQUE: A comprehensive examination was performed utilizing a variety of imaging planes and imaging parameters to optimize visualization of suspected pathology.  Images were performed without contrast.     FINDINGS:   FEMORAL HEAD: Full-thickness chondral loss with lateral femoral head neck osteophytes. Tiny subchondral cysts seen within the  superior lateral femoral head. No acute fracture, dislocation or marrow replacing lesion.  ACETABULUM: Full-thickness chondral loss within the acetabulum with lateral acetabular osteophytes. Tiny subchondral cysts seen within the superior acetabulum. No acute fracture, dislocation or marrow replacing lesion.  OTHER BONES: Degenerative changes are seen within the sacroiliac joints. Degenerative disc and facet disease is partially visualized in the lower lumbar spine. Degenerative changes are seen at the pubic symphysis.  LABRUM: There is a linear cleft of high signal under cutting the base of the anterior and superior labrum at their acetabular attachments consistent with nondisplaced degenerative tears.  EFFUSIONS: Small right hip joint effusion with synovitis  BURSAE: There is thickening with increased fluid in the trochanteric bursa.  TENDONS: Thickening with increased signal of the gluteus minimus and medius at the greater trochanter. No full-thickness or retracted tear.  MUSCLES: No tear or strain.  No inappropriate atrophy.    OTHER: 3.8 cm left uterine fibroid.              Impression: CONCLUSION:      Moderate right hip osteoarthritis.     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     Small right hip joint effusion with synovitis.    .           Dictated by (CST): River Edouard MD on 5/02/2024 at 2:59 PM       Finalized by (CST): River Edouard MD on 5/02/2024 at 3:11 PM              ASSESSMENT AND PLAN:  Pamela is a pleasant 59-year-old female presents for follow-up of her right-sided low back and buttock pain with lateral hip pain which I believe is stemming from a lumbar radiculopathy.  On exam, she has weakness during right great toe extension.  I believe her symptoms are due to an L5 radiculopathy.  She does not have as much groin pain as previously but continues to have tightness in the iliopsoas muscle groups.  I am recommending x-rays as well as an MRI of the lumbar  spine.  I am recommending she begin formal physical therapy with Mihaela at the [facility].  She should continue Tylenol and ice for pain.  She has allergies to NSAIDs as they cause hives.  She will follow-up with me to review the MRI images and discuss the next steps.       RTC in 4 to 6 weeks for MRI review  Discharge Instructions were provided as documented in AVS summary.  The patient was in agreement with the assessment and plan.  All questions were answered.  There were no barriers to learning.         1. Primary osteoarthritis of right hip    2. Myalgia    3. Gait abnormality    4. Greater trochanteric bursitis of both hips    5. Gluteal tendinitis of right buttock    6. Strain of gluteus medius, unspecified laterality, initial encounter    7. Class 1 obesity due to excess calories with serious comorbidity and body mass index (BMI) of 31.0 to 31.9 in adult    8. Lumbar foraminal stenosis    9. Bulge of lumbar disc without myelopathy    10. Lumbar radiculopathy    11. Lumbar spondylosis    12. Mechanical low back pain        Alex B. Behar MD  Physical Medicine and Rehabilitation/Sports Medicine  [facility]      [employer] Cures [employer] Notice to Patient: Medical documents like this are made available to patients in the interest of transparency. However, be advised this is a medical document and it is intended as [id number] communication between your medical providers. This medical document may contain abbreviations, assessments, medical data, and results or other terms that are unfamiliar. Medical documents are intended to carry relevant information, facts as evident, and the clinical opinion of the practitioner. As such, this medical document may be written in language that appears blunt or direct. You are encouraged to contact your medical provider and/or [facility] Patient Experience if you have any questions about this medical document.        [1]    Allergies  Allergen Reactions    Aspirin HIVES    Gluten Meal OTHER (SEE COMMENTS)     GLUTEN FREE    Ib Pro [Ibuprofen] HIVES    Nizoral [Ketoconazole] UNKNOWN    Amoxicillin-Na Benzoate RASH

## 2025-02-11 NOTE — PATIENT INSTRUCTIONS
1) Please get X-rays of the Lumbar spine today on your way out.   ) Please call and schedule your MRI of the Lumbar spine at [phone].  Once you have your MRI scheduled, then call my office again to schedule a follow-up visit soon after your MRI so we may review the images together.  3) Please begin physical therapy as soon as possible.   [facility] with Mihaela Khan  [address] [place] [address] [id number], [place], [place] [id number] [phone]  4) Tylenol 500-1000 mg every 6-8 hours as needed for pain.  No more than 3000 mg daily.  5)  Ice 20 minutes at a time 3-4 times per day

## 2025-02-12 ENCOUNTER — HOSPITAL ENCOUNTER (OUTPATIENT)
Dept: GENERAL RADIOLOGY | Age: 60
Discharge: HOME | End: 2025-02-12
Attending: PHYSICAL MEDICINE & REHABILITATION
Payer: COMMERCIAL

## 2025-02-12 DIAGNOSIS — S76.019A STRAIN OF GLUTEUS MEDIUS, UNSPECIFIED LATERALITY, INITIAL ENCOUNTER: ICD-10-CM

## 2025-02-12 DIAGNOSIS — M48.061 LUMBAR FORAMINAL STENOSIS: ICD-10-CM

## 2025-02-12 DIAGNOSIS — M47.816 LUMBAR SPONDYLOSIS: ICD-10-CM

## 2025-02-12 DIAGNOSIS — R26.9 GAIT ABNORMALITY: ICD-10-CM

## 2025-02-12 DIAGNOSIS — M51.369 BULGE OF LUMBAR DISC WITHOUT MYELOPATHY: ICD-10-CM

## 2025-02-12 DIAGNOSIS — M70.61 GREATER TROCHANTERIC BURSITIS OF BOTH HIPS: ICD-10-CM

## 2025-02-12 DIAGNOSIS — M70.62 GREATER TROCHANTERIC BURSITIS OF BOTH HIPS: ICD-10-CM

## 2025-02-12 DIAGNOSIS — E66.811 CLASS 1 OBESITY DUE TO EXCESS CALORIES WITH SERIOUS COMORBIDITY AND BODY MASS INDEX (BMI) OF 31.0 TO 31.9 IN ADULT: ICD-10-CM

## 2025-02-12 DIAGNOSIS — M79.10 MYALGIA: ICD-10-CM

## 2025-02-12 DIAGNOSIS — M54.16 LUMBAR RADICULOPATHY: ICD-10-CM

## 2025-02-12 DIAGNOSIS — M54.59 MECHANICAL LOW BACK PAIN: ICD-10-CM

## 2025-02-12 DIAGNOSIS — M76.01 GLUTEAL TENDINITIS OF RIGHT BUTTOCK: ICD-10-CM

## 2025-02-12 DIAGNOSIS — E66.09 CLASS 1 OBESITY DUE TO EXCESS CALORIES WITH SERIOUS COMORBIDITY AND BODY MASS INDEX (BMI) OF 31.0 TO 31.9 IN ADULT: ICD-10-CM

## 2025-02-12 DIAGNOSIS — M16.11 PRIMARY OSTEOARTHRITIS OF RIGHT HIP: ICD-10-CM

## 2025-02-12 PROCEDURE — 72110 X-RAY EXAM L-2 SPINE 4/>VWS: CPT | Performed by: PHYSICAL MEDICINE & REHABILITATION

## 2025-02-17 ENCOUNTER — HOSPITAL ENCOUNTER (OUTPATIENT)
Dept: MRI IMAGING | Age: 60
Discharge: HOME | End: 2025-02-17
Attending: PHYSICAL MEDICINE & REHABILITATION
Payer: COMMERCIAL

## 2025-02-17 DIAGNOSIS — E66.811 CLASS 1 OBESITY DUE TO EXCESS CALORIES WITH SERIOUS COMORBIDITY AND BODY MASS INDEX (BMI) OF 31.0 TO 31.9 IN ADULT: ICD-10-CM

## 2025-02-17 DIAGNOSIS — M47.816 LUMBAR SPONDYLOSIS: ICD-10-CM

## 2025-02-17 DIAGNOSIS — E66.09 CLASS 1 OBESITY DUE TO EXCESS CALORIES WITH SERIOUS COMORBIDITY AND BODY MASS INDEX (BMI) OF 31.0 TO 31.9 IN ADULT: ICD-10-CM

## 2025-02-17 DIAGNOSIS — M54.16 LUMBAR RADICULOPATHY: ICD-10-CM

## 2025-02-17 DIAGNOSIS — M70.62 GREATER TROCHANTERIC BURSITIS OF BOTH HIPS: ICD-10-CM

## 2025-02-17 DIAGNOSIS — M51.369 BULGE OF LUMBAR DISC WITHOUT MYELOPATHY: ICD-10-CM

## 2025-02-17 DIAGNOSIS — M76.01 GLUTEAL TENDINITIS OF RIGHT BUTTOCK: ICD-10-CM

## 2025-02-17 DIAGNOSIS — M48.061 LUMBAR FORAMINAL STENOSIS: ICD-10-CM

## 2025-02-17 DIAGNOSIS — S76.019A STRAIN OF GLUTEUS MEDIUS, UNSPECIFIED LATERALITY, INITIAL ENCOUNTER: ICD-10-CM

## 2025-02-17 DIAGNOSIS — M54.59 MECHANICAL LOW BACK PAIN: ICD-10-CM

## 2025-02-17 DIAGNOSIS — M16.11 PRIMARY OSTEOARTHRITIS OF RIGHT HIP: ICD-10-CM

## 2025-02-17 DIAGNOSIS — M79.10 MYALGIA: ICD-10-CM

## 2025-02-17 DIAGNOSIS — R26.9 GAIT ABNORMALITY: ICD-10-CM

## 2025-02-17 DIAGNOSIS — M70.61 GREATER TROCHANTERIC BURSITIS OF BOTH HIPS: ICD-10-CM

## 2025-03-10 NOTE — TELEPHONE ENCOUNTER
S/w Dr. Behar who stated the patient should take diazepam 5mg 30mins prior to MRI appointment and 5mg while in the waiting room.     Order pended to Dr. Behar and MCM sent to patient.

## 2025-03-12 ENCOUNTER — HOSPITAL ENCOUNTER (OUTPATIENT)
Dept: MRI IMAGING | Age: 60
Discharge: HOME | End: 2025-03-12
Attending: PHYSICAL MEDICINE & REHABILITATION
Payer: COMMERCIAL

## 2025-03-12 PROCEDURE — 72148 MRI LUMBAR SPINE W/O DYE: CPT | Performed by: PHYSICAL MEDICINE & REHABILITATION

## 2025-03-19 ENCOUNTER — TELEPHONE (OUTPATIENT)
Dept: PHYSICAL THERAPY | Facility: HOSPITAL | Age: 60
End: 2025-03-19

## 2025-03-25 ENCOUNTER — OFFICE VISIT (OUTPATIENT)
Dept: PHYSICAL THERAPY | Facility: HOSPITAL | Age: 60
End: 2025-03-25
Attending: PHYSICAL MEDICINE & REHABILITATION
Payer: COMMERCIAL

## 2025-03-25 DIAGNOSIS — M76.01 GLUTEAL TENDINITIS OF RIGHT BUTTOCK: ICD-10-CM

## 2025-03-25 DIAGNOSIS — M16.11 PRIMARY OSTEOARTHRITIS OF RIGHT HIP: Primary | ICD-10-CM

## 2025-03-25 DIAGNOSIS — M51.369 BULGE OF LUMBAR DISC WITHOUT MYELOPATHY: ICD-10-CM

## 2025-03-25 DIAGNOSIS — E66.09 CLASS 1 OBESITY DUE TO EXCESS CALORIES WITH SERIOUS COMORBIDITY AND BODY MASS INDEX (BMI) OF 31.0 TO 31.9 IN ADULT: ICD-10-CM

## 2025-03-25 DIAGNOSIS — M48.061 LUMBAR FORAMINAL STENOSIS: ICD-10-CM

## 2025-03-25 DIAGNOSIS — M54.16 LUMBAR RADICULOPATHY: ICD-10-CM

## 2025-03-25 DIAGNOSIS — R26.9 GAIT ABNORMALITY: ICD-10-CM

## 2025-03-25 DIAGNOSIS — M54.59 MECHANICAL LOW BACK PAIN: ICD-10-CM

## 2025-03-25 DIAGNOSIS — S76.019A STRAIN OF GLUTEUS MEDIUS, UNSPECIFIED LATERALITY, INITIAL ENCOUNTER: ICD-10-CM

## 2025-03-25 DIAGNOSIS — M70.62 GREATER TROCHANTERIC BURSITIS OF BOTH HIPS: ICD-10-CM

## 2025-03-25 DIAGNOSIS — M47.816 LUMBAR SPONDYLOSIS: ICD-10-CM

## 2025-03-25 DIAGNOSIS — E66.811 CLASS 1 OBESITY DUE TO EXCESS CALORIES WITH SERIOUS COMORBIDITY AND BODY MASS INDEX (BMI) OF 31.0 TO 31.9 IN ADULT: ICD-10-CM

## 2025-03-25 DIAGNOSIS — M70.61 GREATER TROCHANTERIC BURSITIS OF BOTH HIPS: ICD-10-CM

## 2025-03-25 DIAGNOSIS — M79.10 MYALGIA: ICD-10-CM

## 2025-03-25 PROCEDURE — 97110 THERAPEUTIC EXERCISES: CPT | Performed by: PHYSICAL THERAPIST

## 2025-03-25 PROCEDURE — 97162 PT EVAL MOD COMPLEX 30 MIN: CPT | Performed by: PHYSICAL THERAPIST

## 2025-03-25 PROCEDURE — 97112 NEUROMUSCULAR REEDUCATION: CPT | Performed by: PHYSICAL THERAPIST

## 2025-03-25 NOTE — PROGRESS NOTES
SPINE EVALUATION:     Diagnosis:   Primary osteoarthritis of right hip (M16.11)  Myalgia (M79.10)  Gait abnormality (R26.9)  Greater trochanteric bursitis of both hips (M70.61,M70.62)  Gluteal tendinitis of right buttock (M76.01)  Strain of gluteus medius, unspecified laterality, initial encounter (S76.019A)  Class 1 obesity due to excess calories with serious comorbidity and body mass index (BMI) of 31.0 to 31.9 in adult (E66.811,E66.09,Z68.31)  Lumbar foraminal stenosis (M48.061)  Bulge of lumbar disc without myelopathy (M51.369)  Lumbar radiculopathy (M54.16)  Lumbar spondylosis (M47.816)  Mechanical low back pain (M54.59) Patient:  Pamela Pearson (59 year old, female)        Referring Provider: Alex Behar  Today's Date   3/25/2025    Precautions:      Date of Evaluation: 03/25/25  Next MD visit: next week  Date of Surgery: NA     PATIENT SUMMARY   Summary of chief complaints: Difficutly walking - states her R knee is painful and her R LE gets weakers the most she walks.  Difficutly with stairs - varies in her ability to do stairs.  Has increased pain the more she walks.  History of current condition: Insidious onset - but did canoe that day - had chronic pain x 14 years, noticed she was liming because of R knee pain.  Had a R knee scoped but it didn't help.  Reports  she has pelvic floor PT and two other rounds of PT.  Gets clicking in her R knee.  Sees chiropractor - helped slightly - told her her hips are off.  Had dry needling x 7 visits  Massage therapist.  Had two hip injections R from Dr. Behar - second didn't help. Told her R psoas is tight.  Note seeing much change.  2 years ago - pain was very intense.  States she has gained weight becuase of inactivy.   Pain level: current 4 /10, at best 4 /10, at worst 8 /10  Description of symptoms: Sore in the morning, better sleeping on her R side since the chiropactic.   Occupation: Massage therapist - [employer]   Leisure activities/Hobbies:  going to be grandmother, would like to do more yoga   Prior level of function: Independent in all activities  Current limitations: limited ability to stand, walk, perform stairs, lift and carry objects  Pt goals: decresae pain, resume workouts and improve her walking tolerance  Red flag signs/symptoms: Pt denies dizziness, drop attacks, dysphagia, dysarthria, diplopia; Pt denies changes in bowel/bladder function, saddle anesthesia; Pt denies pain that wakes in sleep, fever, recent trauma, history of CA, pain unchanged with movement/activity; Pt positive for: occasional urine leakage  Past medical history was reviewed with Pamela.  Significant findings include:    Imaging/Tests: Lumbar Spine MRI:  CONCLUSION:   1. Multilevel degenerative changes of the lumbar spine, which are described in detail above. 2. At L4-L5, there is mild-to-moderate right foraminal narrowing and mild left foraminal narrowing. 3. At L3-L4, there is a small disc bulge with superimposed shallow right paracentral disc protrusion that results in minimal right subarticular zone stenosis and minimal right foraminal narrowing. 4. No acute fracture or traumatic listhesis of the lumbar spine. 5. Transitional lumbosacral anatomy with partial sacralization of the left aspect of the L5 vertebral body.  The lowest fully formed disc space is delineated is L5-S1.  Consider total spine imaging prior to surgical intervention for appropriate numbering. 6. Lesser incidental findings described above.   Pmaela  has a past medical history of ALLERGIC RHINITIS, Disorder of thyroid, Hashimoto disease, MITRAL VALVE PROLAPSE, OBESITY, Osteoarthritis, OTHER DISEASES, Plantar fasciitis, Raynaud's disease, Uterine fibroid, and Visual impairment.  She  has a past surgical history that includes cholecystectomy (); arthroscopy of joint unlisted (2012); ; and hernia surgery.    ASSESSMENT  Pamela presents to physical therapy evaluation with primary c/o Difficutly  walking - states her R knee is painful and her R LE gets weakers the most she walks.  Difficutly with stairs - varies in her ability to do stairs.  Has increased pain the more she walks.. The results of the objective tests and measures show decreased ability to reverse the lumbar lordosis, decresaed R hip joint mobility, torsion of the R knee with valgus moment and popping, decreaseda bility to walk, incresed pain that limits her functional mobility. Functional deficits include but are not limited to limited ability to stand, walk, perform stairs, lift and carry objects. Signs and symptoms are consistent with diagnosis of Primary osteoarthritis of right hip (M16.11)  Myalgia (M79.10)  Gait abnormality (R26.9)  Greater trochanteric bursitis of both hips (M70.61,M70.62)  Gluteal tendinitis of right buttock (M76.01)  Strain of gluteus medius, unspecified laterality, initial encounter (S76.019A)  Class 1 obesity due to excess calories with serious comorbidity and body mass index (BMI) of 31.0 to 31.9 in adult (E66.811,E66.09,Z68.31)  Lumbar foraminal stenosis (M48.061)  Bulge of lumbar disc without myelopathy (M51.369)  Lumbar radiculopathy (M54.16)  Lumbar spondylosis (M47.816)  Mechanical low back pain (M54.59). Pt and PT discussed evaluation findings, pathology, POC and HEP.  Pt voiced understanding and performs HEP correctly without reported pain. Skilled Physical Therapy is medically necessary to address the above impairments and reach functional goals.    OBJECTIVE:    Musculoskeletal:  Observation/Posture: anterior pelvic tilt; forward head posture; pelvic asymmetry   Accessory Motion: decreased R joint mobiity   Palpation:       Special tests:   (+) B ADT/PADT/PART     ROM and Strength:  (* denotes performed with pain)  Trunk ROM MMT (-/5)     Flex finger tips to ankle with limited reversal of the lumbar spine       Ext decreased 25%      R L R L     Side bend decresaed 25% WFL         Rotation           ,    Hip   ROM MMT (-/5)    R L R L     Flex (L2) 100 120 5 5     Ext  0 10         Abd 30 40         ER 30 35         IR 0 25        ,   Myotome MMT   MMT (-/5)    R L   Shoulder Abd (C5)       Elbow Ext (C7)       Elbow Flex (C6)       Wrist Flex (C7)       Wrist Ext (C6)       Thumb Ext (C8)       Digit Flex (C8)       Interossei (T1)       Hip Flex (L2) 5 5   Knee Ext (L3) 5 5   Ankle DF (L4) 4 5   Ankle PF (S1) 4 5   Grt Toe Ext (L5) 4- 5       Flexibility:  LE Flexibility R L     Hip Flexor         Hamstrings         ITB         Piriformis         Quads         Gastroc-soleus           Neurological:  Sensation: grossly intact to light touch LEO UE/LE     Deep Tendon Reflexes:       UMN:      Mayers's:       Clonus:       Babinski:       Peripheral Neurodynamic: WNL except     Balance and Functional Mobility:  Gait: pt ambulates on level ground with decreased stance phase; other (use comment) (decreasd R hip extension at push off, decreased ability to WB through the R LE).  Balance: SLS: EO R  , EO L            Today's Treatment and Response:   Pt education was provided on exam findings, treatment diagnosis, treatment plan, expectations, and prognosis.  Today's Treatment       3/25/2025   Spine Treatment   Therapeutic Exercise - RFIS  - RFIL   Neuro Re-Education - 90/90 repositioning  - L SL, R glut max   Therapeutic Exercise Minutes 10   Neuro Re-Educ Minutes 25   Evaluation Minutes 25   Total Time Of Timed Procedures 35   Total Time Of Service-Based Procedures 25   Total Treatment Time 60   HEP - 90/90 repositioning  - L SL, R glut max  - repeated flexion in sitting        Patient was instructed in and issued a HEP for: - 90/90 repositioning  - L SL, R glut max  - repeated flexion in sitting    Charges:  PT EVAL: Moderate Complexity, Eval mod, NM1, TE!  In agreement with evaluation findings and clinical rationale, this evaluation involved MODERATE COMPLEXITY decision making due to 1-2 personal  factors/comorbidities, 3 or more body structures involved/activity limitations, and evolving symptoms as documented in the evaluation.                                                                         PLAN OF CARE:    Goals: (to be met in 10 visits)   1. The pt will be independent in HEP.  2.  The pt will be able to walk moderate community distances without an increase in pain.  3. Centralization of symptoms to the spine.  4.  The pt will displays proper body mechanics for bending to decrease spinal strain.  5.  The pt will be able to lift her grandchild without an increase in pain.  6.  The pt will report a 50% decrease in symptoms.     Frequency / Duration: Patient will be seen 1x/week or a total of 10  visits over a 90 day period. Treatment will include: Gait training; Manual Therapy; Neuromuscular Re-education; Self-Care Home Management; Therapeutic Activities; Therapeutic Exercise; Home Exercise Program instruction; Patient/Family Education    Education or treatment limitation: None   Rehab Potential: good       LEFS Score  LEFS Score: (Patient-Rptd) 58.75 % (3/25/2025 10:02 AM)      Patient/Family/Caregiver was advised of these findings, precautions, and treatment options and has agreed to actively participate in planning and for this course of care.    Thank you for your referral. Please co-sign or sign and return this letter via fax as soon as possible to [phone]. If you have any questions, please contact me at Dept: [phone]    Sincerely,  Electronically signed by therapist: Mihaela Khan, PT  Physician's certification required: Yes  I certify the need for these services furnished under this plan of treatment and while under my care.    X___________________________________________________ Date____________________    Certification From: 3/25/2025  To:6/23/2025

## 2025-04-01 ENCOUNTER — TELEPHONE (OUTPATIENT)
Dept: PHYSICAL MEDICINE AND REHAB | Facility: CLINIC | Age: 60
End: 2025-04-01

## 2025-04-01 ENCOUNTER — TELEMEDICINE (OUTPATIENT)
Dept: PHYSICAL MEDICINE AND REHAB | Facility: CLINIC | Age: 60
End: 2025-04-01
Payer: COMMERCIAL

## 2025-04-01 DIAGNOSIS — E66.09 CLASS 1 OBESITY DUE TO EXCESS CALORIES WITH SERIOUS COMORBIDITY AND BODY MASS INDEX (BMI) OF 31.0 TO 31.9 IN ADULT: ICD-10-CM

## 2025-04-01 DIAGNOSIS — M54.16 LUMBAR RADICULOPATHY: Primary | ICD-10-CM

## 2025-04-01 DIAGNOSIS — M79.10 MYALGIA: ICD-10-CM

## 2025-04-01 DIAGNOSIS — R26.9 GAIT ABNORMALITY: ICD-10-CM

## 2025-04-01 DIAGNOSIS — M76.01 GLUTEAL TENDINITIS OF RIGHT BUTTOCK: ICD-10-CM

## 2025-04-01 DIAGNOSIS — E66.811 CLASS 1 OBESITY DUE TO EXCESS CALORIES WITH SERIOUS COMORBIDITY AND BODY MASS INDEX (BMI) OF 31.0 TO 31.9 IN ADULT: ICD-10-CM

## 2025-04-01 DIAGNOSIS — M70.61 GREATER TROCHANTERIC BURSITIS OF BOTH HIPS: ICD-10-CM

## 2025-04-01 DIAGNOSIS — M54.59 MECHANICAL LOW BACK PAIN: ICD-10-CM

## 2025-04-01 DIAGNOSIS — S76.019A STRAIN OF GLUTEUS MEDIUS, UNSPECIFIED LATERALITY, INITIAL ENCOUNTER: ICD-10-CM

## 2025-04-01 DIAGNOSIS — M70.62 GREATER TROCHANTERIC BURSITIS OF BOTH HIPS: ICD-10-CM

## 2025-04-01 DIAGNOSIS — M47.816 LUMBAR SPONDYLOSIS: ICD-10-CM

## 2025-04-01 DIAGNOSIS — M17.10 PRIMARY OSTEOARTHRITIS OF KNEE, UNSPECIFIED LATERALITY: ICD-10-CM

## 2025-04-01 DIAGNOSIS — M48.061 LUMBAR FORAMINAL STENOSIS: ICD-10-CM

## 2025-04-01 DIAGNOSIS — M22.2X9 PATELLOFEMORAL PAIN SYNDROME, UNSPECIFIED LATERALITY: ICD-10-CM

## 2025-04-01 DIAGNOSIS — M16.11 PRIMARY OSTEOARTHRITIS OF RIGHT HIP: Primary | ICD-10-CM

## 2025-04-01 DIAGNOSIS — M54.16 LUMBAR RADICULOPATHY: ICD-10-CM

## 2025-04-01 DIAGNOSIS — M51.369 BULGE OF LUMBAR DISC WITHOUT MYELOPATHY: ICD-10-CM

## 2025-04-01 PROCEDURE — 98006 SYNCH AUDIO-VIDEO EST MOD 30: CPT | Performed by: PHYSICAL MEDICINE & REHABILITATION

## 2025-04-01 NOTE — PROGRESS NOTES
[facility]  Video Visit Progress Note  CHIEF COMPLAINT:    Chief Complaint   Patient presents with    Low Back Pain    Imaging       History of Present Illness:  The patient is a 59 year old  right-handed female with significant past medical history of RIGHT hip and knee osteoarthritis who presents with right hip discomfort and gait abnormality with weakness in the right leg.  She is status post right hip corticosteroid injection on 2024 with limited improvement in her symptoms.  She was then seen by me on 2025 with complaints of right buttock pain and lateral hip pain which she rated a 6-7 out of 10.  Her symptoms were aggravated by standing and walking.  On exam, she had weakness during right great toe extension.  An MRI of the lumbar spine was performed which I independently reviewed with her today.    PAST MEDICAL HISTORY:  Past Medical History:    ALLERGIC RHINITIS    Disorder of thyroid    Hashimoto disease    MITRAL VALVE PROLAPSE    no insufficiency    OBESITY    Osteoarthritis    RIGHT KNEE    OTHER DISEASES    elevated ocular pressure    Plantar fasciitis         Raynaud's disease         Uterine fibroid         Visual impairment    READING GLASSES       SURGICAL HISTORY:  Past Surgical History:   Procedure Laterality Date    Arthroscopy of joint unlisted  2012    Right knee    Cholecystectomy      Hernia surgery      umbical hernia mesh          x 3        SOCIAL HISTORY:   Social History     Occupational History    Not on file   Tobacco Use    Smoking status: Never    Smokeless tobacco: Never   Vaping Use    Vaping status: Never Used   Substance and Sexual Activity    Alcohol use: Yes     Comment: OCCASIONAL    Drug use: No    Sexual activity: Yes     Partners: Male     Birth control/protection: Vasectomy       FAMILY HISTORY:   Family History   Problem Relation Age of Onset    Other (abdominal aortic aneurysm[other]) Mother     Lipids  Mother     Hypertension Mother     Heart Disorder Mother         CABG age 60    Cancer Maternal Grandfather         bone    Cancer Paternal Grandfather         bone    Kidney Disease Father        CURRENT MEDICATIONS:   Current Outpatient Medications   Medication Sig Dispense Refill    diazePAM 5 MG Oral Tab Take 1 tab(5mg) 30 minutes prior to MRI and 1 tab(5mg) in the waiting room. 2 tablet 0    Ergocalciferol (VITAMIN D OR) Take by mouth.      levothyroxine 50 MCG Oral Tab Take 1 tablet (50 mcg total) by mouth before breakfast.      latanoprost 0.005 % Ophthalmic Solution Place 1 drop into both eyes nightly. 7.5 mL 3    NP THYROID 120 MG Oral Tab Take 1 tablet (120 mg total) by mouth daily.         ALLERGIES:   Allergies[1]    REVIEW OF SYSTEMS:   Review of Systems   Constitutional: Negative.    HENT: Negative.    Eyes: Negative.    Respiratory: Negative.    Cardiovascular: Negative.    Gastrointestinal: Negative.    Genitourinary: Negative.    Musculoskeletal: As per HPI  Skin: Negative.    Neurological: As per HPI  Endo/Heme/Allergies: Negative.    Psychiatric/Behavioral: Negative.      All other systems reviewed and are negative. Pertinent positives and negatives noted in the HPI.        PHYSICAL EXAM:     There is no height or weight on file to calculate BMI.    General: No immediate distress  Head: Normocephalic/ Atraumatic  Eyes: Extra-occular movements intact  Ears/Nose/Throat:  External appearance identifies normal appearance without obvious deformity  Cardiovascular: No cyanosis, clubbing or edema  Respiratory: Non-labored respirations  Skin: No lesions noted   Neurological: alert & oriented x 3, attentive, able to follow commands, comprehention intact, spontaneous speech intact  Psychiatric: Mood and affect appropriate        Data  No visits with results within 6 Month(s) from this visit.   Latest known visit with results is:   Admission on 08/23/2024, Discharged on 08/23/2024   Component Date Value Ref  Range Status    Case Report 08/23/2024    Final                    Value:Surgical Pathology                                Case: [id number]                                   Authorizing Provider:  Sridevi Hendricks MD       Collected:           08/23/2024 02:08 PM          Ordering Location:     [facility]    Received:            08/23/2024 04:07 PM          Pathologist:           Farhan Mclain MD                                                             Specimen:    Endometrial curettage, Endometrial currettings                                             Final Diagnosis: 08/23/2024    Final                    Value:Endometrial curetting:                          -Endometrial polyp.                          -Squamous and endocervical epithelium with no significant pathologic                           change.                          -Negative for malignancy.                                                        Clinical Information 08/23/2024    Final                    Value:Thickened Endometrium. Endometrial polyp.     Gross Description 08/23/2024    Final                    Value:Received in formalin labeled with the patient's name and \" endometrial                           curettings\". It consists of a 2.8 x 1.8 x 0.3 cm aggregate of soft                           tan-pink to red tissue admixed with mucus and blood clots, received on                           multiple Telfa pads. The specimen is submitted entirely in A1.                                                     (TB)   ]      Radiology Imaging:  I reviewed with the patient her MRI of the lumbar spine   MRI SPINE LUMBAR (CPT=72148)  Narrative: PROCEDURE: MRI SPINE LUMBAR (CPT=72148)     COMPARISON: [facility], XR LUMBAR SPINE AP LAT FLEX EXT (CPT=72110), 2/12/2025, 9:33 AM.     INDICATIONS: M54.59 Mechanical low back pain M47.816 Lumbar spondylosis M54.16 Lumbar radiculopathy M51.369 Bulge of lumbar disc  without myelopathy M48.061 Lumbar foraminal*     TECHNIQUE: A variety of imaging planes and parameters were utilized for visualization of suspected pathology.     FINDINGS:      ALIGNMENT: The lumbar lordosis is intact.  No significant listhesis.  The lowest fully formed disc space is delineated L5-S1.  There is transitional lumbosacral anatomy with partial sacralization of the L5 vertebral.  PARASPINAL AREA: No mass.  No hematoma.  Minimal subcutaneous edema involving the posterior lumbar soft tissues.  BONES: The vertebral body heights are maintained.  No acute fracture.  The background marrow signal is within normal limits.  No focal suspicious marrow replacing lesion.  CORD/CAUDA EQUINA: The conus terminates at T12-L1.  The inferior aspect of the spinal cord is normal in caliber and signal intensity.  No clumping or nodularity of the cauda equina nerve roots.  No intrathecal collection.  OTHER: Mild prominence of the common bile duct measuring up to 8 mm in diameter, which is likely secondary to the post cholecystectomy state.  There is a 10 mm right ovarian cyst (7:38).     LUMBAR DISC LEVELS:     There is multilevel disc desiccation with loss of normal T2 disc signal, which is most noticeable at L2-L5.  There is mild multilevel disc height loss, which is most advanced at L4-L5.  There are few small ventral disc osteophyte complexes.  There are   minimal scattered degenerative endplate changes.     L1-L2: No canal stenosis or foraminal narrowing.  Mild-to-moderate facet arthropathy.  L2-L3: Small disc bulge.  Moderate facet arthropathy.  No canal stenosis.  No foraminal narrowing.  L3-L4: Small disc bulge with superimposed shallow right paracentral protrusion (2:9).  Moderate facet arthropathy.  Ligamentum flavum hypertrophy.  No canal stenosis.  Minimal right subarticular zone stenosis.  Minimal right foraminal narrowing.  L4-L5: Small disc bulge with superimposed right far foraminal protrusion (5:18).  There  are marginal osteophytes.  Moderate facet arthropathy.  Ligamentum flavum hypertrophy.  No canal stenosis.  Mild to moderate right foraminal narrowing.  Mild left   foraminal narrowing.  L5-S1: No canal stenosis or foraminal narrowing.              Impression: CONCLUSION:      1. Multilevel degenerative changes of the lumbar spine, which are described in detail above.  2. At L4-L5, there is mild-to-moderate right foraminal narrowing and mild left foraminal narrowing.  3. At L3-L4, there is a small disc bulge with superimposed shallow right paracentral disc protrusion that results in minimal right subarticular zone stenosis and minimal right foraminal narrowing.  4. No acute fracture or traumatic listhesis of the lumbar spine.  5. Transitional lumbosacral anatomy with partial sacralization of the left aspect of the L5 vertebral body.  The lowest fully formed disc space is delineated is L5-S1.  Consider total spine imaging prior to surgical intervention for appropriate   numbering.  6. Lesser incidental findings described above.           Dictated by (CST): Michel Ramsey MD on 3/17/2025 at 3:51 PM       Finalized by (CST): Michel Ramsey MD on 3/17/2025 at 4:01 PM              ASSESSMENT AND PLAN:  Pamela is a pleasant 59-year-old female presents for follow-up of her right-sided low back and buttock pain.  I have reviewed her MRI of the lumbar spine which she does have mild multilevel degenerative changes most notably at L4-L5 where she has a broad-based disc bulge leading to compression of the L4 and L5 nerve roots.  I am recommending a right L4 and right L5 transforaminal epidural steroid injection under local anesthesia.  She should continue working with Mihaela in physical therapy and follow-up with me 2 to 4 weeks after the procedure.       RTC in 2 to 4 weeks after procedure  Discharge Instructions were provided as documented in AVS summary.  The patient was in agreement with the assessment and plan.  All  questions were answered.  There were no barriers to learning.         1. Primary osteoarthritis of right hip    2. Myalgia    3. Gait abnormality    4. Greater trochanteric bursitis of both hips    5. Gluteal tendinitis of right buttock    6. Strain of gluteus medius, unspecified laterality, initial encounter    7. Class 1 obesity due to excess calories with serious comorbidity and body mass index (BMI) of 31.0 to 31.9 in adult    8. Lumbar foraminal stenosis    9. Bulge of lumbar disc without myelopathy    10. Lumbar radiculopathy    11. Lumbar spondylosis    12. Mechanical low back pain    13. Patellofemoral pain syndrome, unspecified laterality    14. Primary osteoarthritis of knee, unspecified laterality        Alex B. Behar MD  Physical Medicine and Rehabilitation/Sports Medicine  [facility]    [email or web address] Cures Act Notice to Patient: Medical documents like this are made available to patients in the interest of transparency. However, be advised this is a medical document and it is intended as iywc-je-wnjs communication between your medical providers. This medical document may contain abbreviations, assessments, medical data, and results or other terms that are unfamiliar. Medical documents are intended to carry relevant information, facts as evident, and the clinical opinion of the practitioner. As such, this medical document may be written in language that appears blunt or direct. You are encouraged to contact your medical provider and/or [facility] Experience if you have any questions about this medical document.        [1]   Allergies  Allergen Reactions    Aspirin HIVES    Gluten Meal OTHER (SEE COMMENTS)     GLUTEN FREE    Ib Pro [Ibuprofen] HIVES    Nizoral [Ketoconazole] UNKNOWN    Amoxicillin-Na Benzoate RASH

## 2025-04-01 NOTE — PATIENT INSTRUCTIONS
1) My office will call you to schedule the RIGHT L4 and RIGHT L5 TFESI under local anesthesia once the procedure is approved by your insurance carrier.    2) Continue working with Mihaela in physical therapy  3) Follow up with me 2-4 weeks after the procedure. This can be in the office or virtually.

## 2025-04-01 NOTE — TELEPHONE ENCOUNTER
Initiated authorization for RIGHT L4 and RIGHT L5 TFESI. CPT/HCPCS 90290, 61735 dx:M54.16 to be done at [facility] with [employer] portal.    Status: Approved  Reference/Authorization # [id number]  Valid: 04/01/2025 - 04/30/2025  Authorization is not a guarantee of payment and may be subject to review once claim is submitted.

## 2025-04-01 NOTE — TELEPHONE ENCOUNTER
Patient has been scheduled for Right L4 and L5 transforaminal epidural steroid injection on 4/16/2025 at the [facility] with Dr. Behar.   Anesthesia type:  Local  Please note: The [facility] will call the business day prior to discuss the exact time/arrival and additional instructions for your appointment.  Patient was advised that if he/she does receive the covid vaccine it needs to be at least 2 weeks before or after the injection.  Medications and allergies reviewed  Patient informed of [facility]'s  policy:  The patient will require transportation arrangements to and from the procedure, with the  present on site for the entire visit.  Without a , the appointment is subject to cancellation.    [facility] is located in the [facility] 1st floor [address], [place] [address].   may park in the yellow/purple parking lot.  Patient verbalized understanding and agrees with plan.  Scheduled in Epic: Yes  Scheduled in Surgical Case: Yes  Follow up appointment made: NOV: Visit date not found  Authorization date valid until 4/30/2025 at [facility].

## 2025-04-04 ENCOUNTER — OFFICE VISIT (OUTPATIENT)
Dept: PHYSICAL THERAPY | Facility: HOSPITAL | Age: 60
End: 2025-04-04
Attending: PHYSICAL MEDICINE & REHABILITATION
Payer: COMMERCIAL

## 2025-04-04 PROCEDURE — 97530 THERAPEUTIC ACTIVITIES: CPT | Performed by: PHYSICAL THERAPIST

## 2025-04-04 PROCEDURE — 97112 NEUROMUSCULAR REEDUCATION: CPT | Performed by: PHYSICAL THERAPIST

## 2025-04-04 NOTE — PROGRESS NOTES
Patient: Pamela Pearson (59 year old, female) Referring Provider:  Insurance:   Diagnosis: Primary osteoarthritis of right hip (M16.11)  Myalgia (M79.10)  Gait abnormality (R26.9)  Greater trochanteric bursitis of both hips (M70.61,M70.62)  Gluteal tendinitis of right buttock (M76.01)  Strain of gluteus medius, unspecified laterality, initial encounter (S76.019A)  Class 1 obesity due to excess calories with serious comorbidity and body mass index (BMI) of 31.0 to 31.9 in adult (E66.811,E66.09,Z68.31)  Lumbar foraminal stenosis (M48.061)  Bulge of lumbar disc without myelopathy (M51.369)  Lumbar radiculopathy (M54.16)  Lumbar spondylosis (M47.816)  Mechanical low back pain (M54.59) Alex Behar  [employer] POS   Date of Surgery: NA Next MD visit:  N/A   Precautions:    next week Referral Information:    Date of Evaluation: Req: 0, Auth: 0, Exp:     03/25/25 POC Auth Visits:  10       Today's Date   4/4/2025    Subjective  Reports she had her MRI review with Dr. Behar who wants to do a LAYLA at L4L5.  Reports she might postpose the injection unilt May.       Pain: 3/10     Objective  antalgic gait pattern with decreaed hip extension R       Assessment  No adverse effects to treatment. Focused on IO/TA activities this date.  The pt overuses her anterior neck instead of her IO/TA.  Did best in supine and given those for her HEP.    Goals (to be met in 10 visits)   1. The pt will be independent in HEP.  2.  The pt will be able to walk moderate community distances without an increase in pain.  3. Centralization of symptoms to the spine.  4.  The pt will displays proper body mechanics for bending to decrease spinal strain.  5.  The pt will be able to lift her grandchild without an increase in pain.  6.  The pt will report a 50% decrease in symptoms.         Plan  Cont PT - progress IO/TA activities to standing if tolerated    Treatment Last 4 Visits  Treatment Day: 2       3/25/2025 4/4/2025   Spine Treatment   Therapeutic  Exercise - RFIS  - RFIL    Neuro Re-Education - 90/90 repositioning  - L SL, R glut max - supine hookying IO/TA  - supine bar reach  - supine T8 extension  - modified all 4 belly lift   Therapeutic Activity  - education on LAYLA and spinal anatomy  - education on activity after an LAYLA   Therapeutic Exercise Minutes 10    Neuro Re-Educ Minutes 25 38   Therapeutic Activity Minutes  8   Evaluation Minutes 25    Total Time Of Timed Procedures 35 46   Total Time Of Service-Based Procedures 25 0   Total Treatment Time 60 46   HEP - 90/90 repositioning  - L SL, R glut max  - repeated flexion in sitting - supine hookying IO/TA  - supine bar reach  - supine T8 extension        HEP  - supine hookying IO/TA  - supine bar reach  - supine T8 extension    Charges  NM3, TA1

## 2025-04-08 ENCOUNTER — OFFICE VISIT (OUTPATIENT)
Dept: PHYSICAL THERAPY | Facility: HOSPITAL | Age: 60
End: 2025-04-08
Attending: PHYSICAL MEDICINE & REHABILITATION
Payer: COMMERCIAL

## 2025-04-08 PROCEDURE — 97112 NEUROMUSCULAR REEDUCATION: CPT | Performed by: PHYSICAL THERAPIST

## 2025-04-08 PROCEDURE — 97530 THERAPEUTIC ACTIVITIES: CPT | Performed by: PHYSICAL THERAPIST

## 2025-04-08 PROCEDURE — 97140 MANUAL THERAPY 1/> REGIONS: CPT | Performed by: PHYSICAL THERAPIST

## 2025-04-08 NOTE — PROGRESS NOTES
Patient: Pamela Pearson (59 year old, female) Referring Provider:  Insurance:   Diagnosis: Primary osteoarthritis of right hip (M16.11)  Myalgia (M79.10)  Gait abnormality (R26.9)  Greater trochanteric bursitis of both hips (M70.61,M70.62)  Gluteal tendinitis of right buttock (M76.01)  Strain of gluteus medius, unspecified laterality, initial encounter (S76.019A)  Class 1 obesity due to excess calories with serious comorbidity and body mass index (BMI) of 31.0 to 31.9 in adult (E66.811,E66.09,Z68.31)  Lumbar foraminal stenosis (M48.061)  Bulge of lumbar disc without myelopathy (M51.369)  Lumbar radiculopathy (M54.16)  Lumbar spondylosis (M47.816)  Mechanical low back pain (M54.59) Jacob Behar  [employer] POS   Date of Surgery: NA Next MD visit:  N/A   Precautions:    next week Referral Information:    Date of Evaluation: Req: 0, Auth: 0, Exp:     03/25/25 POC Auth Visits:  10       Today's Date   4/8/2025    Subjective  Reports she feels like a lot of things are changing.  States she had dry needling at a chiropractor in [place].       Pain: 3/10     Objective  decreased mobility R hip joint, decreased hip extension during late stance phase          Assessment  No adverse effects to treamtent.  The pt has significant difficutly finding and feeling the R glut max.  May have to focus on L IC adductor and glut med if she has not been able to find it at home.  Note decreased R hip joint mobillity wtih TTP of the R iliopsoas (no release secondary to abomdinal mesh) and myospasm of the adductors R.  Updated HEP,    Goals (to be met in 10 visits)   1. The pt will be independent in HEP.  2.  The pt will be able to walk moderate community distances without an increase in pain.  3. Centralization of symptoms to the spine.  4.  The pt will displays proper body mechanics for bending to decrease spinal strain.  5.  The pt will be able to lift her grandchild without an increase in pain.  6.  The pt will report a 50% decrease in  symptoms.             Plan  Cont PT - progress IO/TA activities to standing if tolerated    Treatment Last 4 Visits  Treatment Day: 3       3/25/2025 4/4/2025 4/8/2025   Spine Treatment   Therapeutic Exercise - RFIS  - RFIL     Neuro Re-Education - 90/90 repositioning  - L SL, R glut max - supine hookying IO/TA  - supine bar reach  - supine T8 extension  - modified all 4 belly lift - supine resisted R glut max  - L SL, resisted R glut max  - 90/90 R glut max with AF ER  - R adductor inhibition in supine       Manual Therapy   R hip joint mobs with belt   Therapeutic Activity  - education on LAYLA and spinal anatomy  - education on activity after an LAYLA - walking with walking poles   - education on hip and back anatomy  - education on the neurological aspect of movement   Therapeutic Exercise Minutes 10     Neuro Re-Educ Minutes 25 38 25   Manual Therapy Minutes   10   Therapeutic Activity Minutes  8 15   Evaluation Minutes 25     Total Time Of Timed Procedures 35 46 50   Total Time Of Service-Based Procedures 25 0 0   Total Treatment Time 60 46 50   HEP - 90/90 repositioning  - L SL, R glut max  - repeated flexion in sitting - supine hookying IO/TA  - supine bar reach  - supine T8 extension - 90/90 R glut max with AF ER  - R adductor inhibition in supine        HEP  - 90/90 R glut max with AF ER  - R adductor inhibition in supine    Charges  NM2, Man1, TA1

## 2025-04-15 ENCOUNTER — OFFICE VISIT (OUTPATIENT)
Dept: PHYSICAL THERAPY | Facility: HOSPITAL | Age: 60
End: 2025-04-15
Attending: PHYSICAL MEDICINE & REHABILITATION
Payer: COMMERCIAL

## 2025-04-15 PROCEDURE — 97530 THERAPEUTIC ACTIVITIES: CPT | Performed by: PHYSICAL THERAPIST

## 2025-04-15 PROCEDURE — 97112 NEUROMUSCULAR REEDUCATION: CPT | Performed by: PHYSICAL THERAPIST

## 2025-04-15 PROCEDURE — 97140 MANUAL THERAPY 1/> REGIONS: CPT | Performed by: PHYSICAL THERAPIST

## 2025-04-15 NOTE — PROGRESS NOTES
Patient: Pamela Pearson (59 year old, female) Referring Provider:  Insurance:   Diagnosis: Primary osteoarthritis of right hip (M16.11)  Myalgia (M79.10)  Gait abnormality (R26.9)  Greater trochanteric bursitis of both hips (M70.61,M70.62)  Gluteal tendinitis of right buttock (M76.01)  Strain of gluteus medius, unspecified laterality, initial encounter (S76.019A)  Class 1 obesity due to excess calories with serious comorbidity and body mass index (BMI) of 31.0 to 31.9 in adult (E66.811,E66.09,Z68.31)  Lumbar foraminal stenosis (M48.061)  Bulge of lumbar disc without myelopathy (M51.369)  Lumbar radiculopathy (M54.16)  Lumbar spondylosis (M47.816)  Mechanical low back pain (M54.59) Jacob Behar  [employer] POS   Date of Surgery: NA Next MD visit:  N/A   Precautions:    next week Referral Information:    Date of Evaluation: Req: 0, Auth: 0, Exp:     03/25/25 POC Auth Visits:  10       Today's Date   4/15/2025    Subjective  States she doesn't have much pain.  Feels like her walking is a little better but states she feels like it goes back to the way she walked before.  Reports the belt mobs seem to help last time.  Feeling her R glut more if she really concentrates.       Pain: 3/10     Objective  decreased mobility R hip joint, decreased hip extension during late stance phase          Assessment  No adverse effects to treatment.  The pt dsiplays improved R hip extension after joint mobs and R glut activities in mor extended positions. Gait pattern improved after the session.  Updated HEP.    Goals (to be met in 10 visits)   1. The pt will be independent in HEP.  2.  The pt will be able to walk moderate community distances without an increase in pain.  3. Centralization of symptoms to the spine.  4.  The pt will displays proper body mechanics for bending to decrease spinal strain.  5.  The pt will be able to lift her grandchild without an increase in pain.  6.  The pt will report a 50% decrease in  symptoms.                 Plan  Cont PT - progress IO/TA activities and R glut max activities    Treatment Last 4 Visits  Treatment Day: 4       3/25/2025 4/4/2025 4/8/2025 4/15/2025   Spine Treatment   Therapeutic Exercise - RFIS  - RFIL      Neuro Re-Education - 90/90 repositioning  - L SL, R glut max - supine hookying IO/TA  - supine bar reach  - supine T8 extension  - modified all 4 belly lift - supine resisted R glut max  - L SL, resisted R glut max  - 90/90 R glut max with AF ER  - R adductor inhibition in supine     -Standing Wall Supported Left Knee Flexion   with Resisted Right Glute Max    - R lat dorsi inhibition    - PME expansion on wall    Left Sidelying Supported Right Glute Max   with Right Hip Extension and Right FA ER       Manual Therapy   R hip joint mobs with belt - R hip joint mobs with belt   Therapeutic Activity  - education on LAYLA and spinal anatomy  - education on activity after an LAYLA - walking with walking poles   - education on hip and back anatomy  - education on the neurological aspect of movement - L stance during ADL's  - R stance during ADL's  - rib cage positioning while standing   Therapeutic Exercise Minutes 10      Neuro Re-Educ Minutes 25 38 25 23   Manual Therapy Minutes   10 15   Therapeutic Activity Minutes  8 15 8   Evaluation Minutes 25      Total Time Of Timed Procedures 35 46 50 46   Total Time Of Service-Based Procedures 25 0 0 0   Total Treatment Time 60 46 50 46   HEP - 90/90 repositioning  - L SL, R glut max  - repeated flexion in sitting - supine hookying IO/TA  - supine bar reach  - supine T8 extension - 90/90 R glut max with AF ER  - R adductor inhibition in supine -Standing Wall Supported Left Knee Flexion   with Resisted Right Glute Max    - R lat dorsi inhibition    - PME expansion on wall    Left Sidelying Supported Right Glute Max   with Right Hip Extension and Right FA ER        HEP  -Standing Wall Supported Left Knee Flexion   with Resisted Right Glute  Max    - R lat dorsi inhibition    - PME expansion on wall    Left Sidelying Supported Right Glute Max   with Right Hip Extension and Right FA ER    Charges   NM2, Man1, TA1

## 2025-04-29 ENCOUNTER — OFFICE VISIT (OUTPATIENT)
Dept: PHYSICAL THERAPY | Facility: HOSPITAL | Age: 60
End: 2025-04-29
Attending: PHYSICAL MEDICINE & REHABILITATION
Payer: COMMERCIAL

## 2025-04-29 PROCEDURE — 97140 MANUAL THERAPY 1/> REGIONS: CPT | Performed by: PHYSICAL THERAPIST

## 2025-04-29 PROCEDURE — 97112 NEUROMUSCULAR REEDUCATION: CPT | Performed by: PHYSICAL THERAPIST

## 2025-04-29 NOTE — PROGRESS NOTES
Patient: Pamela Pearson (59 year old, female) Referring Provider:  Insurance:   Diagnosis: Primary osteoarthritis of right hip (M16.11)  Myalgia (M79.10)  Gait abnormality (R26.9)  Greater trochanteric bursitis of both hips (M70.61,M70.62)  Gluteal tendinitis of right buttock (M76.01)  Strain of gluteus medius, unspecified laterality, initial encounter (S76.019A)  Class 1 obesity due to excess calories with serious comorbidity and body mass index (BMI) of 31.0 to 31.9 in adult (E66.811,E66.09,Z68.31)  Lumbar foraminal stenosis (M48.061)  Bulge of lumbar disc without myelopathy (M51.369)  Lumbar radiculopathy (M54.16)  Lumbar spondylosis (M47.816)  Mechanical low back pain (M54.59) Jacob Behar  [employer] POS   Date of Surgery: NA Next MD visit:  N/A   Precautions:    next week Referral Information:    Date of Evaluation: Req: 0, Auth: 0, Exp:     03/25/25 POC Auth Visits:  10       Today's Date   4/29/2025    Subjective  Reports she is feeling small improvements in her walking since starting PT.  States she can feel her glut on the standing exercisea and with her foot behind her.  States she feels like her R knee turns in.  Goign to be traveling this weekend.       Pain: 3/10     Objective  R joint mobility min restricted,          Assessment  No advere effects to treatment.  The pt displays improved R hip joint mobility this date but continues to display a shortened hip flexor (avoided psoas release secondary to abdominal mesh) and valgus movement at the R knee.  Added more CKC R quad strengteniing activities to decreased valgus movement.  Updated HEP    Goals (to be met in 10 visits)   1. The pt will be independent in HEP.  2.  The pt will be able to walk moderate community distances without an increase in pain.  3. Centralization of symptoms to the spine.  4.  The pt will displays proper body mechanics for bending to decrease spinal strain.  5.  The pt will be able to lift her grandchild without an increase in  pain.  6.  The pt will report a 50% decrease in symptoms.                     Plan  Cont PT - assess the effects of R quad strengthening on gait pattern.  Will consider adding more L stances activities next visit    Treatment Last 4 Visits  Treatment Day: 5       4/4/2025 4/8/2025 4/15/2025 4/29/2025   Spine Treatment   Neuro Re-Education - supine hookying IO/TA  - supine bar reach  - supine T8 extension  - modified all 4 belly lift - supine resisted R glut max  - L SL, resisted R glut max  - 90/90 R glut max with AF ER  - R adductor inhibition in supine     -Standing Wall Supported Left Knee Flexion   with Resisted Right Glute Max    - R lat dorsi inhibition    - PME expansion on wall    Left Sidelying Supported Right Glute Max   with Right Hip Extension and Right FA ER     - Sitting L adductor pull back  -Standing Supported Right Knee Flexion   with Weighted Left Proximal Hamstring  - Lalo 90/90 with R glut max     Manual Therapy  R hip joint mobs with belt - R hip joint mobs with belt - R hip joint mobs with belt  - R knee/hip extension on bolster in SL with active contraction  - foam rolling R glut/hip flexor below the abdomen   Therapeutic Activity - education on LAYLA and spinal anatomy  - education on activity after an LAYLA - walking with walking poles   - education on hip and back anatomy  - education on the neurological aspect of movement - L stance during ADL's  - R stance during ADL's  - rib cage positioning while standing    Neuro Re-Educ Minutes 38 25 23 23   Manual Therapy Minutes  10 15 23   Therapeutic Activity Minutes 8 15 8    Total Time Of Timed Procedures 46 50 46 46   Total Time Of Service-Based Procedures 0 0 0 0   Total Treatment Time 46 50 46 46   HEP - supine hookying IO/TA  - supine bar reach  - supine T8 extension - 90/90 R glut max with AF ER  - R adductor inhibition in supine -Standing Wall Supported Left Knee Flexion   with Resisted Right Glute Max    - R lat dorsi inhibition    - PME  expansion on wall    Left Sidelying Supported Right Glute Max   with Right Hip Extension and Right FA ER - Sitting L adductor pull back  -Standing Supported Right Knee Flexion   with Weighted Left Proximal Hamstring        HEP  - Sitting L adductor pull back  -Standing Supported Right Knee Flexion   with Weighted Left Proximal Hamstring    Charges   Man2, NM2

## 2025-05-06 ENCOUNTER — OFFICE VISIT (OUTPATIENT)
Dept: PHYSICAL THERAPY | Facility: HOSPITAL | Age: 60
End: 2025-05-06
Attending: PHYSICAL MEDICINE & REHABILITATION
Payer: COMMERCIAL

## 2025-05-06 PROCEDURE — 97112 NEUROMUSCULAR REEDUCATION: CPT | Performed by: PHYSICAL THERAPIST

## 2025-05-06 PROCEDURE — 97140 MANUAL THERAPY 1/> REGIONS: CPT | Performed by: PHYSICAL THERAPIST

## 2025-05-06 NOTE — PROGRESS NOTES
Patient: Pamela Peasron (59 year old, female) Referring Provider:  Insurance:   Diagnosis:   Alex Behar  [employer] POS   Date of Surgery: No data recorded Next MD visit:  N/A   Precautions:    No data recorded Referral Information:    Date of Evaluation: Req: 0, Auth: 0, Exp:     No data recorded POC Auth Visits:  10       Today's Date   5/6/2025    Subjective  Reportsshe is feeling a little better.  States she is does feel better walking.  Reports the manual hip flexor stretching helped.       Pain: 2/10     Objective  R joint mobility min restricted,            Assessment  No adverse effect to trematent.  The pt displayus improved, more upright posture with less hip flexion R in standing.  Able to get more hip extension R with ambulation.  Continues to display weakness on the R hip and R quad which affects her gait pattern but her ability to activate is improving.    Goals (to be met in 10 visits)   1. The pt will be independent in HEP.  2.  The pt will be able to walk moderate community distances without an increase in pain.  3. Centralization of symptoms to the spine.  4.  The pt will displays proper body mechanics for bending to decrease spinal strain.  5.  The pt will be able to lift her grandchild without an increase in pain.  6.  The pt will report a 50% decrease in symptoms.                         Plan  Cont PT - assess the effects of R quad strengthening on gait pattern.  Will consider adding more L stances activities next visit    Treatment Last 4 Visits  Treatment Day: 8       4/8/2025 4/15/2025 4/29/2025 5/6/2025   Spine Treatment   Neuro Re-Education - supine resisted R glut max  - L SL, resisted R glut max  - 90/90 R glut max with AF ER  - R adductor inhibition in supine     -Standing Wall Supported Left Knee Flexion   with Resisted Right Glute Max    - R lat dorsi inhibition    - PME expansion on wall    Left Sidelying Supported Right Glute Max   with Right Hip Extension and Right FA ER     -  Sitting L adductor pull back  -Standing Supported Right Knee Flexion   with Weighted Left Proximal Hamstring  - Lalo 90/90 with R glut max   -Standing Wall Supported Right Knee Flexion  with Left Glute Med and Right Trunk Rotation    -Standing Supported Left AF IR with Right FA Abduction   Manual Therapy R hip joint mobs with belt - R hip joint mobs with belt - R hip joint mobs with belt  - R knee/hip extension on bolster in SL with active contraction  - foam rolling R glut/hip flexor below the abdomen - manual hip mobs with belt  - manual STM R anterior hip  - R anterior hip mobs with bolster   Therapeutic Activity - walking with walking poles   - education on hip and back anatomy  - education on the neurological aspect of movement - L stance during ADL's  - R stance during ADL's  - rib cage positioning while standing     Neuro Re-Educ Minutes 25 23 23 23   Manual Therapy Minutes 10 15 23 23   Therapeutic Activity Minutes 15 8     Total Time Of Timed Procedures 50 46 46 46   Total Time Of Service-Based Procedures 0 0 0 0   Total Treatment Time 50 46 46 46   HEP - 90/90 R glut max with AF ER  - R adductor inhibition in supine -Standing Wall Supported Left Knee Flexion   with Resisted Right Glute Max    - R lat dorsi inhibition    - PME expansion on wall    Left Sidelying Supported Right Glute Max   with Right Hip Extension and Right FA ER - Sitting L adductor pull back  -Standing Supported Right Knee Flexion   with Weighted Left Proximal Hamstring -Standing Wall Supported Right Knee Flexion  with Left Glute Med and Right Trunk Rotation    -Standing Supported Left AF IR with Right FA Abduction        HEP  -Standing Wall Supported Right Knee Flexion  with Left Glute Med and Right Trunk Rotation    -Standing Supported Left AF IR with Right FA Abduction    Charges  Man,2, Nm2

## 2025-05-13 ENCOUNTER — OFFICE VISIT (OUTPATIENT)
Dept: PHYSICAL THERAPY | Facility: HOSPITAL | Age: 60
End: 2025-05-13
Attending: PHYSICAL MEDICINE & REHABILITATION
Payer: COMMERCIAL

## 2025-05-13 PROCEDURE — 97112 NEUROMUSCULAR REEDUCATION: CPT | Performed by: PHYSICAL THERAPIST

## 2025-05-13 PROCEDURE — 97140 MANUAL THERAPY 1/> REGIONS: CPT | Performed by: PHYSICAL THERAPIST

## 2025-05-13 PROCEDURE — 97530 THERAPEUTIC ACTIVITIES: CPT | Performed by: PHYSICAL THERAPIST

## 2025-05-13 NOTE — PROGRESS NOTES
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 POS   Date of Surgery: No data recorded Next MD visit:  N/A   Precautions:    No data recorded Referral Information:    Date of Evaluation: Req: 0, Auth: 0, Exp:     No data recorded POC Auth Visits:  10       Today's Date   5/13/2025    Subjective  States she can get R foot up and over the shower.  Wore the tape until Sat       Pain: 2/10     Objective  TTP over the R glut max with TP release         Assessment  No adverse effects to treatment.  Continues to need more L LE stability and R glut max engagment alnog with R quad activation.  Overall gait improving. Also needs increased R hip flexor length and B IO/TA activiteis.  Updated HEP    Goals (to be met in 10 visits)   1. The pt will be independent in HEP.  2.  The pt will be able to walk moderate community distances without an increase in pain.  3. Centralization of symptoms to the spine.  4.  The pt will displays proper body mechanics for bending to decrease spinal strain.  5.  The pt will be able to lift her grandchild without an increase in pain.  6.  The pt will report a 50% decrease in symptoms.                             Plan  Cont PT - PN next visit    Treatment Last 4 Visits  Treatment Day: 9       4/15/2025 4/29/2025 5/6/2025 5/13/2025   Spine Treatment   Therapeutic Exercise    - CKC TKE against wall with ball  - standing hip flexor stretch with posterior pelvic tilt and R arm OH   Neuro Re-Education -Standing Wall Supported Left Knee Flexion   with Resisted Right Glute Max    - R lat dorsi inhibition    - PME expansion on wall    Left Sidelying Supported Right Glute Max   with Right Hip Extension and Right FA ER     - Sitting L adductor pull back  -Standing Supported Right Knee Flexion   with Weighted Left Proximal Hamstring  - Lalo 90/90 with R glut max   -Standing Wall Supported Right Knee Flexion  with Left Glute Med and Right  Trunk Rotation    -Standing Supported Left AF IR with Right FA Abduction -Standing Wall Supported Right Knee Flexion  with Left Glute Med and Right Trunk Rotation    - single leg RDL's    - standing table supported IO//TA    - standing integration #74    - knee taping for alignment   Manual Therapy - R hip joint mobs with belt - R hip joint mobs with belt  - R knee/hip extension on bolster in SL with active contraction  - foam rolling R glut/hip flexor below the abdomen - manual hip mobs with belt  - manual STM R anterior hip  - R anterior hip mobs with bolster - STM R glut max   Therapeutic Activity - L stance during ADL's  - R stance during ADL's  - rib cage positioning while standing      Therapeutic Exercise Minutes    10   Neuro Re-Educ Minutes 23 23 23 25   Manual Therapy Minutes 15 23 23 15   Therapeutic Activity Minutes 8      Total Time Of Timed Procedures 46 46 46 50   Total Time Of Service-Based Procedures 0 0 0 0   Total Treatment Time 46 46 46 50   HEP -Standing Wall Supported Left Knee Flexion   with Resisted Right Glute Max    - R lat dorsi inhibition    - PME expansion on wall    Left Sidelying Supported Right Glute Max   with Right Hip Extension and Right FA ER - Sitting L adductor pull back  -Standing Supported Right Knee Flexion   with Weighted Left Proximal Hamstring -Standing Wall Supported Right Knee Flexion  with Left Glute Med and Right Trunk Rotation    -Standing Supported Left AF IR with Right FA Abduction -Standing Wall Supported Right Knee Flexion  with Left Glute Med and Right Trunk Rotation    - single leg RDL's    - standing table supported IO//TA    - standing hip flexor stretch with posterior pelvic tilt        HEP  -Standing Wall Supported Right Knee Flexion  with Left Glute Med and Right Trunk Rotation    - single leg RDL's    - standing table supported IO//TA    - standing hip flexor stretch with posterior pelvic tilt    Charges  NM2, TE1, Man1

## 2025-05-20 ENCOUNTER — APPOINTMENT (OUTPATIENT)
Dept: PHYSICAL THERAPY | Facility: HOSPITAL | Age: 60
End: 2025-05-20
Attending: PHYSICAL MEDICINE & REHABILITATION
Payer: COMMERCIAL

## 2025-05-27 ENCOUNTER — OFFICE VISIT (OUTPATIENT)
Dept: PHYSICAL THERAPY | Facility: HOSPITAL | Age: 60
End: 2025-05-27
Attending: PHYSICAL MEDICINE & REHABILITATION
Payer: COMMERCIAL

## 2025-05-27 PROCEDURE — 97112 NEUROMUSCULAR REEDUCATION: CPT | Performed by: PHYSICAL THERAPIST

## 2025-05-27 PROCEDURE — 97110 THERAPEUTIC EXERCISES: CPT | Performed by: PHYSICAL THERAPIST

## 2025-05-27 PROCEDURE — 97140 MANUAL THERAPY 1/> REGIONS: CPT | Performed by: PHYSICAL THERAPIST

## 2025-05-27 NOTE — PROGRESS NOTES
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 POS   Date of Surgery: No data recorded Next MD visit:  N/A   Precautions:    No data recorded Referral Information:    Date of Evaluation: Req: 0, Auth: 0, Exp:     No data recorded POC Auth Visits:  10     Patient Name: Pamela Pearson  YOB: 1965          MRN number:  [id number]  Referring Physician:  Alex Behar    Progress Summary    Pt has attended 10, cancelled 0, and no shown 0 visits in Physical Therapy from the Evaluation on 3/25/2025 to treatment date of 5/27/2025    Pain Level at Eval 8 Current 3  % improvement since starting PT 40%       Today's Date   5/27/2025    Subjective  Reports her R knee was really sore after the last visit.  States it is back to his baseline.  Did some ice.  States she is doing her HEP.       Pain: 3/10     Objective  Valgus position of the R knee with IR at the R hip, R quad strength 4-/5, R glut strength 4-/5         Assessment  Pamela Pearson has completed 10 visits and reports 40% impovement.  The pt continues to lack qud strength when her knee is over her ankle and fatiques quickly but her overall gait pattern has improved and she continues to make gains.  Recommend continued PT 1 time every other week for up to 10 visits.  The pt is in agreeement with the POC    Goals (to be met in 10 visits)     1. The pt will be independent in HEP.  MET 5/27/2025  2.  The pt will be able to walk moderate community distances without an increase in pain.  PROGRESSING 5/27/2025  3. Centralization of symptoms to the spine.  MET 5/27/2025  4.  The pt will displays proper body mechanics for bending to decrease spinal strain.  MET 5/27/2025  5.  The pt will be able to lift her grandchild without an increase in pain.  PROGRESSING 5/27/2025  6.  The pt will report a 50% decrease in symptoms.  PROGRESSING 5/27/2025     Plan  Cont PT 1 tiime every other week for  up to 10 visits    Treatment Last 4 Visits  Treatment Day: 10       4/29/2025 5/6/2025 5/13/2025 5/27/2025   Spine Treatment   Therapeutic Exercise   - CKC TKE against wall with ball  - standing hip flexor stretch with posterior pelvic tilt and R arm OH - 3 way hip in standing  - anterior step up R LE - 4 inch step   Neuro Re-Education - Sitting L adductor pull back  -Standing Supported Right Knee Flexion   with Weighted Left Proximal Hamstring  - Lalo 90/90 with R glut max   -Standing Wall Supported Right Knee Flexion  with Left Glute Med and Right Trunk Rotation    -Standing Supported Left AF IR with Right FA Abduction -Standing Wall Supported Right Knee Flexion  with Left Glute Med and Right Trunk Rotation    - single leg RDL's    - standing table supported IO//TA    - standing integration #74    - knee taping for alignment - knee taping R  - R stance with knee over ankle  - R stance with L UE on wall with hip hike   Manual Therapy - R hip joint mobs with belt  - R knee/hip extension on bolster in SL with active contraction  - foam rolling R glut/hip flexor below the abdomen - manual hip mobs with belt  - manual STM R anterior hip  - R anterior hip mobs with bolster - STM R glut max - manual joint mobs R knee  - patella mobs R knee  - long axis distraction R LE   Therapeutic Exercise Minutes   10 10   Neuro Re-Educ Minutes 23 23 25 15   Manual Therapy Minutes 23 23 15 23   Total Time Of Timed Procedures 46 46 50 48   Total Time Of Service-Based Procedures 0 0 0 0   Total Treatment Time 46 46 50 48   HEP - Sitting L adductor pull back  -Standing Supported Right Knee Flexion   with Weighted Left Proximal Hamstring -Standing Wall Supported Right Knee Flexion  with Left Glute Med and Right Trunk Rotation    -Standing Supported Left AF IR with Right FA Abduction -Standing Wall Supported Right Knee Flexion  with Left Glute Med and Right Trunk Rotation    - single leg RDL's    - standing table supported IO//TA    -  standing hip flexor stretch with posterior pelvic tilt - 3 way hip in standing  - anterior step up R LE - 4 inch step        HEP  - 3 way hip in standing  - anterior step up R LE - 4 inch step    Charges  TE1, Man2, NM1     Patient was advised of these findings, precautions, and treatment options and has agreed to actively participate in planning and for this course of care.    Thank you for your referral. Please co-sign or sign and return this letter via fax as soon as possible to [phone]. If you have any questions, please contact me at Dept: [phone].    Sincerely,  KARLA COON PT    Electronically signed by therapist: KARLA COON PT    Physician's certification required: Yes  I certify the need for these services furnished under this plan of treatment and while under my care.    X___________________________________________________ Date____________________    Certification From: 5/27/2025  To:8/25/2025

## 2025-06-10 ENCOUNTER — OFFICE VISIT (OUTPATIENT)
Dept: PHYSICAL THERAPY | Facility: HOSPITAL | Age: 60
End: 2025-06-10
Attending: PHYSICAL MEDICINE & REHABILITATION
Payer: COMMERCIAL

## 2025-06-10 PROCEDURE — 97530 THERAPEUTIC ACTIVITIES: CPT | Performed by: PHYSICAL THERAPIST

## 2025-06-10 PROCEDURE — 97140 MANUAL THERAPY 1/> REGIONS: CPT | Performed by: PHYSICAL THERAPIST

## 2025-06-10 NOTE — PROGRESS NOTES
Patient: Pamela Pearson (59 year old, female) Referring Provider:  Insurance:   Diagnosis:   Jacob Behar  BCBS POS   Date of Surgery: No data recorded Next MD visit:  N/A   Precautions:    No data recorded Referral Information:    Date of Evaluation: Req: 0, Auth: 0, Exp:     No data recorded POC Auth Visits:  20       Today's Date   6/10/2025    Subjective  Reports she is more sore from traveling and not eating well.  Reports she didn't eat well or do her HEP much when traveling.       Pain: 4/10     Objective  TTP over the R piriformis         Assessment  No adverse effects to treatment. The pt was TTP of the R piriformis and displayed a more antalgic gait pattern with decreased hip extension.  Encouarged walking in the pool as much as possible.  Focused on manual therapy this date.    Goals (to be met in 20 visits)   1. The pt will be independent in HEP.  MET 5/27/2025  2.  The pt will be able to walk moderate community distances without an increase in pain.  PROGRESSING 5/27/2025  3. Centralization of symptoms to the spine.  MET 5/27/2025  4.  The pt will displays proper body mechanics for bending to decrease spinal strain.  MET 5/27/2025  5.  The pt will be able to lift her grandchild without an increase in pain.  PROGRESSING 5/27/2025  6.  The pt will report a 50% decrease in symptoms.  PROGRESSING 5/27/2025         Plan  Cont PT - continue joint mobility, glut and R LE strengthening and gait training    Treatment Last 4 Visits  Treatment Day: 11       5/6/2025 5/13/2025 5/27/2025 6/10/2025   Spine Treatment   Therapeutic Exercise  - CKC TKE against wall with ball  - standing hip flexor stretch with posterior pelvic tilt and R arm OH - 3 way hip in standing  - anterior step up R LE - 4 inch step    Neuro Re-Education -Standing Wall Supported Right Knee Flexion  with Left Glute Med and Right Trunk Rotation    -Standing Supported Left AF IR with Right FA Abduction -Standing Wall Supported Right Knee  Flexion  with Left Glute Med and Right Trunk Rotation    - single leg RDL's    - standing table supported IO//TA    - standing integration #74    - knee taping for alignment - knee taping R  - R stance with knee over ankle  - R stance with L UE on wall with hip hike    Manual Therapy - manual hip mobs with belt  - manual STM R anterior hip  - R anterior hip mobs with bolster - STM R glut max - manual joint mobs R knee  - patella mobs R knee  - long axis distraction R LE - STM R piriformis  - manual hip mobs with belt  - manual STM R anterior hip  - R anterior hip mobs with bolster       Therapeutic Activity    - education on how diet an affect inflammation   Therapeutic Exercise Minutes  10 10    Neuro Re-Educ Minutes 23 25 15    Manual Therapy Minutes 23 15 23 38   Therapeutic Activity Minutes    8   Total Time Of Timed Procedures 46 50 48 46   Total Time Of Service-Based Procedures 0 0 0 0   Total Treatment Time 46 50 48 46   HEP -Standing Wall Supported Right Knee Flexion  with Left Glute Med and Right Trunk Rotation    -Standing Supported Left AF IR with Right FA Abduction -Standing Wall Supported Right Knee Flexion  with Left Glute Med and Right Trunk Rotation    - single leg RDL's    - standing table supported IO//TA    - standing hip flexor stretch with posterior pelvic tilt - 3 way hip in standing  - anterior step up R LE - 4 inch step         HEP  - 3 way hip in standing  - anterior step up R LE - 4 inch step    Charges  Man2, TA1

## 2025-06-17 ENCOUNTER — PATIENT MESSAGE (OUTPATIENT)
Dept: PHYSICAL MEDICINE AND REHAB | Facility: CLINIC | Age: 60
End: 2025-06-17

## 2025-06-17 DIAGNOSIS — M51.369 BULGE OF LUMBAR DISC WITHOUT MYELOPATHY: ICD-10-CM

## 2025-06-17 DIAGNOSIS — M54.59 MECHANICAL LOW BACK PAIN: ICD-10-CM

## 2025-06-17 DIAGNOSIS — M48.061 LUMBAR FORAMINAL STENOSIS: ICD-10-CM

## 2025-06-17 DIAGNOSIS — M54.16 LUMBAR RADICULOPATHY: Primary | ICD-10-CM

## 2025-06-17 DIAGNOSIS — M47.816 LUMBAR SPONDYLOSIS: ICD-10-CM

## 2025-06-18 NOTE — TELEPHONE ENCOUNTER
Patient has been scheduled for Right L4 and L5 Transforaminal Epidural Steroid Injection on 7/2/2025 at the [facility] with Dr. Behar.   Anesthesia type:  Local  Please note: The [facility] will call the business day prior to discuss the exact time/arrival and additional instructions for your appointment.  Patient was advised that if he/she does receive the covid vaccine it needs to be at least 2 weeks before or after the injection.  Medications and allergies reviewed.  Patient informed of [facility]'s  policy:  The patient will require transportation arrangements to and from the procedure, with the  present on site for the entire visit.  Without a , the appointment is subject to cancellation.    [facility] is located in the [facility] 1st floor [address], [place] [address].   may park in the yellow/purple parking lot.  Patient verbalized understanding and agrees with plan.  Scheduled in Epic: Yes  Scheduled in Surgical Case: Yes  Follow up appointment made: NOV: 7/15/2025 Behar, Alex, MD  Authorization date valid until 7/17/2025 at [facility].

## 2025-06-18 NOTE — TELEPHONE ENCOUNTER
Initiated authorization for RIGHT L4 and RIGHT L5 TFESI. CPT/HCPCS 64559, 07884 dx:M54.16 to be done at [facility] with [employer] portal.     Status: Approved  Reference/Authorization # [id number]  Valid: 06/18/2025 - 07/17/2025  Authorization is not a guarantee of payment and may be subject to review once claim is submitted.     PLEASE INFORM THE PATIENT TO CONTACT THE OFFICE IF THE INSURANCE CHANGES AS HE/SHE IS RESPONSIBLE TO UPDATE THE OFFICE IF INSURANCE CHANGES SO THAT PROCEDURE IS BILLED CORRECTLY.

## 2025-07-03 ENCOUNTER — APPOINTMENT (OUTPATIENT)
Dept: PHYSICAL THERAPY | Facility: HOSPITAL | Age: 60
End: 2025-07-03
Attending: PHYSICAL MEDICINE & REHABILITATION

## 2025-07-17 ENCOUNTER — APPOINTMENT (OUTPATIENT)
Dept: PHYSICAL THERAPY | Facility: HOSPITAL | Age: 60
End: 2025-07-17
Attending: PHYSICAL MEDICINE & REHABILITATION

## 2025-07-30 PROBLEM — M54.16 LUMBAR RADICULOPATHY: Status: ACTIVE | Noted: 2025-07-30

## 2025-07-30 PROBLEM — M48.061 LUMBAR FORAMINAL STENOSIS: Status: ACTIVE | Noted: 2025-07-30

## 2025-07-30 PROBLEM — M51.369 BULGE OF LUMBAR DISC WITHOUT MYELOPATHY: Status: ACTIVE | Noted: 2025-07-30

## 2025-08-14 ENCOUNTER — OFFICE VISIT (OUTPATIENT)
Dept: PHYSICAL THERAPY | Facility: HOSPITAL | Age: 60
End: 2025-08-14
Attending: PHYSICAL MEDICINE & REHABILITATION

## 2025-08-14 PROCEDURE — 97530 THERAPEUTIC ACTIVITIES: CPT | Performed by: PHYSICAL THERAPIST

## 2025-08-14 PROCEDURE — 97140 MANUAL THERAPY 1/> REGIONS: CPT | Performed by: PHYSICAL THERAPIST

## 2025-08-28 ENCOUNTER — APPOINTMENT (OUTPATIENT)
Dept: PHYSICAL THERAPY | Facility: HOSPITAL | Age: 60
End: 2025-08-28
Attending: PHYSICAL MEDICINE & REHABILITATION

## (undated) DEVICE — SET TUBING DELTER CYSTO IRRIG L77IN DIA0.241IN BLDR NVENT

## (undated) DEVICE — SOLUTION IRRIG 1000ML 0.9% NACL USP BTL

## (undated) DEVICE — PACK GYNE CUSTOM

## (undated) DEVICE — SOLUTION IRRIG 3000ML 0.9% NACL FLX CONT

## (undated) DEVICE — SLEEVE COMPR MD KNEE LEN SGL USE KENDALL SCD

## (undated) DEVICE — GLOVE SUR 6 SENSICARE PI PIP CRM PWD F

## (undated) DEVICE — PREMIUM WET SKIN PREP TRAY: Brand: MEDLINE INDUSTRIES, INC.

## (undated) NOTE — LETTER
AUTHORIZATION FOR SURGICAL OPERATION OR OTHER PROCEDURE    1. I hereby authorize Dr. Alex Behar and the [facility] staff assigned to my case to perform the following operation and/or procedure at the [facility]:    RIGHT hip CSI under Ultrasound Guidance     2.  My physician has explained the nature and purpose of the operation or other procedure, possible alternative methods of treatment, the risks involved, and the possibility of complication to me.  I acknowledge that no guarantee has been made as to the result that may be obtained.  3.  I recognize that, during the course of this operation, or other procedure, unforseen conditions may necessitate additional or different procedure than those listed above.  I, therefore, further authorize and request that the above named physician, his/her physician assistants or designees perform such procedures as are, in his/her professional opinion, necessary and desirable.  4.  Any tissue or organs removed in the operation or other procedure may be disposed of by and at the discretion of the [facility] staff and [facility].  5.  I understand that in the event of a medical emergency, I will be transported by local paramedics to [facility] or other hospital emergency department.  6.  I certify that I have read and fully understand the above consent to operation and/or other procedure.    7.  I acknowledge that my physician has explained sedation/analgesia administration to me including the risks and benefits.  I consent to the administration of sedation/analgesia as may be necessary or desirable in the judgement of my physician.    Witness signature: ___________________________________________________ Date:  ______/______/_____                    Time:  ________ A.M.  P.M.       Patient Name:    [id number]  10/25/1965  Pamela Pearson    Patient signature:  ___________________________________________________    Statement of Physician  My  signature below affirms that prior to the time of the procedure, I have explained to the patient and/or his/her guardian, the risks and benefits involved in the proposed treatment and any reasonable alternative to the proposed treatment.  I have also explained the risks and benefits involved in the refusal of the proposed treatment and have answered the patient's questions.                        Date:  ______/______/_______  Provider                      Signature:  __________________________________________________________       Time:  ___________ A.M    P.M.

## (undated) NOTE — LETTER
Notifier: [employer]       Patient Name: Pamela Pearson       Identification Number: [id number]                          Advance Beneficiary Notice of Noncoverage (ABN)   NOTE:  If Medicare doesn’t pay for D. Items/service(s) below, you may have to pay.  Medicare does not pay for everything, even some care that you or your health care provider have good reason to think you need. We expect Medicare may not pay for the D. items/service(s) below.  Items or Services  RIGHT hip CSI under ultrasound guidance Reason Medicare May Not Pay: Estimated Cost   __EKG ($129.00)  __Pap smear ($48.23) __Pelvic/Breast ($65.00)  __ Ear Irrigation ($149)  _x_ Injection(s)  ___ Tdap ($70)       ___ Meningitis ($206)   __Prevnar ($285)  ___ Td ($51)            ___Shingrix ($215)        __Prevnar 20 ($309)  ___ Hep A ($156)   ___Prolia ($1827.00)     __ Xiaflex ($              )   ___ Hep B ($167)      __Pneumovax ($155)                                            ___ Vaccine Administration ($31)   __ Medicare does not cover this service      __ Medicare may not pay for this   item/service for your condition     __ Medicare may not pay for this item/service as often as this        WHAT YOU NEED TO DO NOW:  Read this notice, so you can make an informed decision about your care.  Ask us any questions that you may have after you finish reading.  Choose an option below about whether to receive the D. item/service(s)  listed above.  Note: If you choose Option 1 or 2, we may help you to use any other insurance that you might have, but Medicare cannot require us to do this.  OPTIONS: Check only one box.  We cannot choose a box for you.   OPTION 1. I want the D. item/service(s) listed above. You may ask to be paid now, but I also want Medicare billed for an official decision on payment, which is sent to me on a Medicare Summary Notice (MSN). I understand that if Medicare doesn’t pay, I am responsible for payment, but I can appeal to  Medicare by following the directions on the MSN. If Medicare does pay, you will refund any payments I made to you, less co-pays or deductibles.  OPTION 2. I want the D. item/service(s) listed above, but do not bill Medicare. You may ask to be paid now as I am responsible for payment. I cannot appeal if Medicare is not billed.  OPTION 3. I don't want the D. item/service(s) listed above. I understand with this choice I am not responsible for payment, and I cannot appeal to see if Medicare would pay.    H. Additional Information:    This notice gives our opinion, not an official Medicare decision. If you have other questions on this notice or Medicare billing, call 1-800-MEDICARE (1-735.698.3891/TTY: [phone]). Signing below means that you have received and understand this notice. You also receive a copy.  Signature: Date:       You have the right to get Medicare information in an accessible format, like large print, Braille, or audio. You also have the right to file a complaint if you feel you’ve been discriminated against. Visit Medicare.gov/about- us/xamzyvkrwtkyf-amvpiimcaokrkhryc-ihzsex.  According to the Paperwork Reduction Act of 1995, no persons are required to respond to a collection of information unless it displays a valid OMB control number. The valid OMB control number for this information collection is 7345-9371. The time required to complete this information collection is estimated to average 7 minutes per response, including the time to review instructions, search existing data resources, gather the data needed, and complete and review the information collection. If you have comments concerning the accuracy of the time estimate or suggestions for improving this form, please write to: CMS, [facility] [employer]     Mariano Attn: ESTELLE Reports Clearance Officer, [place], [place] [address].  Form CMS-R-131 (Exp. 1/31/2026) Form Approved OMB No. [id number]

## (undated) NOTE — LETTER
AUTHORIZATION FOR SURGICAL OPERATION OR OTHER PROCEDURE    1. I hereby authorize Dr. Alex Behar and the [facility] staff assigned to my case to perform the following operation and/or procedure at the [facility]:    RIGHT hip CSI under ultrasound guidance    2.  My physician has explained the nature and purpose of the operation or other procedure, possible alternative methods of treatment, the risks involved, and the possibility of complication to me.  I acknowledge that no guarantee has been made as to the result that may be obtained.  3.  I recognize that, during the course of this operation, or other procedure, unforseen conditions may necessitate additional or different procedure than those listed above.  I, therefore, further authorize and request that the above named physician, his/her physician assistants or designees perform such procedures as are, in his/her professional opinion, necessary and desirable.  4.  Any tissue or organs removed in the operation or other procedure may be disposed of by and at the discretion of the [facility] staff and [facility].  5.  I understand that in the event of a medical emergency, I will be transported by local paramedics to [facility] or other hospital emergency department.  6.  I certify that I have read and fully understand the above consent to operation and/or other procedure.    7.  I acknowledge that my physician has explained sedation/analgesia administration to me including the risks and benefits.  I consent to the administration of sedation/analgesia as may be necessary or desirable in the judgement of my physician.    Witness signature: ___________________________________________________ Date:  ______/______/_____                    Time:  ________ A.M.  P.M.       Patient Name:  Pamela Pearson  10/25/1965  [id number]         Patient signature:  ___________________________________________________             Relationship to  Patient:      Statement of Physician  My signature below affirms that prior to the time of the procedure, I have explained to the patient and/or his/her guardian, the risks and benefits involved in the proposed treatment and any reasonable alternative to the proposed treatment.  I have also explained the risks and benefits involved in the refusal of the proposed treatment and have answered the patient's questions.                        Date:  ______/______/_______  Provider                      Signature:  __________________________________________________________       Time:  ___________ A.M    P.M.

## (undated) NOTE — LETTER
Date: 2024      Patient Name: Pamela Pearson      : 10/25/1965        Thank you for choosing [facility] as your health care provider. Your physician has deemed the following medical service(s) necessary. However, your insurance plan may not pay for all of your health care and costs and may deny payment for this service. The fact that your insurance plan does not pay for an item or service does not mean you should not receive it. The purpose of this form is to help you make an informed decision about whether or not you want to receive this service(s) that may not be paid for by your insurance plan.    CPT Code Description     Cost       RIGHT hip CSI under Ultrasound Guidance     I understand that the above mentioned service(s) or supply may not be covered by my insurance company. I agree to be financially responsible for the cost of this service or supply in the event of my insurance denies payment as a non-covered benefit.        ______________________________________________________________________  Signature of Patient or Patient's Representative  Relationship  Date    ______________________________________________________________________  Signature of Witness to signing of form   Printed Name